# Patient Record
Sex: FEMALE | Employment: FULL TIME | URBAN - METROPOLITAN AREA
[De-identification: names, ages, dates, MRNs, and addresses within clinical notes are randomized per-mention and may not be internally consistent; named-entity substitution may affect disease eponyms.]

---

## 2017-02-11 ENCOUNTER — TELEPHONE (OUTPATIENT)
Dept: INTERNAL MEDICINE CLINIC | Age: 22
End: 2017-02-11

## 2017-02-11 RX ORDER — CEFUROXIME AXETIL 500 MG/1
500 TABLET ORAL 2 TIMES DAILY
Qty: 14 TAB | Refills: 0 | Status: SHIPPED | OUTPATIENT
Start: 2017-02-11 | End: 2017-06-20 | Stop reason: ALTCHOICE

## 2017-02-11 RX ORDER — OSELTAMIVIR PHOSPHATE 75 MG/1
CAPSULE ORAL
Qty: 10 CAP | Refills: 0 | Status: SHIPPED | OUTPATIENT
Start: 2017-02-11 | End: 2017-06-20 | Stop reason: ALTCHOICE

## 2017-02-11 NOTE — TELEPHONE ENCOUNTER
Patient with 36 hours of fever, myalgias sinus drainage. Will treat for flu and sinus.   she is to call if symptoms persist over 4 days

## 2017-05-31 ENCOUNTER — HOSPITAL ENCOUNTER (OUTPATIENT)
Dept: CT IMAGING | Age: 22
Discharge: HOME | End: 2017-05-31
Attending: ORTHOPAEDIC SURGERY
Payer: COMMERCIAL

## 2017-05-31 DIAGNOSIS — S42.442A: ICD-10-CM

## 2017-05-31 PROCEDURE — 73200 CT UPPER EXTREMITY W/O DYE: CPT

## 2017-06-20 ENCOUNTER — OFFICE VISIT (OUTPATIENT)
Dept: INTERNAL MEDICINE CLINIC | Age: 22
End: 2017-06-20

## 2017-06-20 VITALS
SYSTOLIC BLOOD PRESSURE: 120 MMHG | BODY MASS INDEX: 29.95 KG/M2 | HEART RATE: 62 BPM | OXYGEN SATURATION: 99 % | RESPIRATION RATE: 16 BRPM | DIASTOLIC BLOOD PRESSURE: 76 MMHG | WEIGHT: 169 LBS | HEIGHT: 63 IN | TEMPERATURE: 98.3 F

## 2017-06-20 DIAGNOSIS — J02.9 PHARYNGITIS, UNSPECIFIED ETIOLOGY: Primary | ICD-10-CM

## 2017-06-20 DIAGNOSIS — J02.9 PHARYNGITIS, UNSPECIFIED ETIOLOGY: ICD-10-CM

## 2017-06-20 RX ORDER — CEPHALEXIN 500 MG/1
CAPSULE ORAL
Qty: 30 CAP | Refills: 0 | Status: SHIPPED | OUTPATIENT
Start: 2017-06-20 | End: 2017-11-28 | Stop reason: SDUPTHER

## 2017-06-20 NOTE — PROGRESS NOTES
Patient presents for   Chief Complaint   Patient presents with    Fever    Sore Throat    Mass     neck glands     Fall risk assessment was not indicated. Depression screening was not indicated Follow up questions were not indicated. 1. Have you been to the ER, urgent care clinic since your last visit? Hospitalized since your last visit? No    2. Have you seen or consulted any other health care providers outside of the [facility] since your last visit? Include any pap smears or colon screening.  No

## 2017-06-20 NOTE — PROGRESS NOTES
The patient presents to the office today with the chief complaint of sore throat    HPI    The patient complains of 24 hours of sore throat. The patient notes several weeks of low grade fever to 100.5 degrees. The patient is status post a fracture of her left elbow. This in now in a hinged splint. She is receiving physical therapy      Review of Systems   Constitutional: Positive for fever. HENT: Positive for sore throat. Respiratory: Negative for shortness of breath. Cardiovascular: Negative for chest pain and leg swelling. No Known Allergies    Current Outpatient Prescriptions   Medication Sig Dispense Refill    cephALEXin (KEFLEX) 500 mg capsule 1 capsule three times per day 30 Cap 0    etonogestrel 68 mg impl by SubDERmal route. Past Medical History:   Diagnosis Date    Acute laryngitis, without mention of obstruction     Acute pharyngitis     Acute upper respiratory infections of unspecified site     Dizziness and giddiness     Other and unspecified noninfectious gastroenteritis and colitis     Paresthesia        History reviewed. No pertinent surgical history. Social History     Social History    Marital status: UNKNOWN     Spouse name: N/A    Number of children: N/A    Years of education: N/A     Occupational History    Not on file. Social History Main Topics    Smoking status: Never Smoker    Smokeless tobacco: Never Used    Alcohol use No    Drug use: No    Sexual activity: Yes     Partners: Male     Other Topics Concern    Not on file     Social History Narrative       Patient does not have an advanced directive on file    Visit Vitals    /76 (BP 1 Location: Left arm, BP Patient Position: Sitting)    Pulse 62    Temp 98.3 °F (36.8 °C) (Tympanic)    Resp 16    Ht 5' 3\" (1.6 m)    Wt 169 lb (76.7 kg)    SpO2 99%    BMI 29.94 kg/m2       Physical Exam   HENT:   Oral pharynx:   Moderately red in posterior oral pharynx   Neck: No thyromegaly present. Cardiovascular: Normal rate and regular rhythm. Exam reveals no gallop. No murmur heard. Pulmonary/Chest: She has no wheezes. She has no rales. Musculoskeletal:   Firm area of tissue left proximal arm   Lymphadenopathy:     She has no cervical adenopathy. BMI:  BMI is high but it was not addressed during this visit due to an acute illness      No visits with results within 3 Month(s) from this visit. Latest known visit with results is:    Abstract on 09/09/2015   Component Date Value Ref Range Status    Creatinine, External 03/27/2015 0.58   Final       .No results found for any visits on 06/20/17. Assessment / Plan      ICD-10-CM ICD-9-CM    1. Pharyngitis, unspecified etiology J02.9 462 CBC WITH AUTOMATED DIFF      METABOLIC PANEL, COMPREHENSIVE      C REACTIVE PROTEIN, QT      SED RATE (ESR)     Labs  Keflex  To call if fever persists    Follow-up Disposition:  Return in about 4 months (around 10/20/2017). I asked Sally Lai if she has any questions and I answered the questions.   Sally Lai states that she understands the treatment plan and agrees with the treatment plan

## 2017-06-20 NOTE — MR AVS SNAPSHOT
Visit Information Date & Time Provider Department Dept. Phone Encounter #  
 6/20/2017  4:00 PM Thang Vizcaino MD [facility] [place] [phone] [id number] Your Appointments 8/21/2017  4:00 PM  
Follow Up with Thang Vizcaino MD  
[facility] OF [place] [address]) Appt Note: 2 mo f/u  
 [address] [place], [address] [facility] [address]  
  
   
 [address] [facility] [address] [facility] [address] Upcoming Health Maintenance Date Due  
 HPV AGE 9Y-34Y (1 of 3 - Female 3 Dose Series) 8/4/2004 DTaP/Tdap/Td series (2 - Tdap) 8/4/2014 PAP AKA CERVICAL CYTOLOGY 8/4/2014 INFLUENZA AGE 9 TO ADULT 8/1/2017 Allergies as of 6/20/2017  Review Complete On: 6/20/2017 By: Vermell Goldberg, LPN No Known Allergies Current Immunizations  Never Reviewed Name Date Td 6/27/2014 Not reviewed this visit You Were Diagnosed With   
  
 Codes Comments Pharyngitis, unspecified etiology    -  Primary ICD-10-CM: J02.9 ICD-9-CM: 014 Vitals BP Pulse Temp Resp Height(growth percentile) 120/76 (BP 1 Location: Left arm, BP Patient Position: Sitting) 62 98.3 °F (36.8 °C) (Tympanic) 16 5' 3\" (1.6 m) Weight(growth percentile) SpO2 BMI OB Status Smoking Status 169 lb (76.7 kg) 99% 29.94 kg/m2 Implant Never Smoker Vitals History BMI and BSA Data Body Mass Index Body Surface Area  
 29.94 kg/m 2 1.85 m 2 Preferred Pharmacy Pharmacy Name Phone [facility] [address] [place] [address] [phone] Your Updated Medication List  
  
   
This list is accurate as of: 6/20/17  5:10 PM.  Always use your most recent med list.  
  
  
  
  
 cephALEXin 500 mg capsule Commonly known as:  KEFLEX  
1 capsule three times per day  
  
 citalopram 20 mg tablet Commonly known as:  CELEXA  
1 tablet daily  
  
 etonogestrel 68 mg Impl by SubDERmal route. fluticasone 50 mcg/actuation nasal spray Commonly known as:  FLONASE  
1 spray up each nostril twice per day for one week then may use daily Prescriptions Sent to Pharmacy Refills  
 cephALEXin (KEFLEX) 500 mg capsule 0 Si capsule three times per day Class: Normal  
 Pharmacy: [address], [address] [place] [address] #: [phone] To-Do List   
 2017 Lab:  C REACTIVE PROTEIN, QT   
  
 2017 Lab:  METABOLIC PANEL, COMPREHENSIVE   
  
 2017 Lab:  SED RATE (ESR)   
  
 10/20/2017 Lab:  CBC WITH AUTOMATED DIFF Introducing [facility]! Cris Herr introduces [employer] patient portal. Now you can access parts of your medical record, email your doctor's office, and request medication refills online. 1. In your internet browser, go to [email or web address] 2. Click on the First Time User? Click Here link in the Sign In box. You will see the New Member Sign Up page. 3. Enter your [employer] Access Code exactly as it appears below. You will not need to use this code after youve completed the sign-up process. If you do not sign up before the expiration date, you must request a new code. · [employer] Access Code: [email or web address] Expires: 2017  6:10 PM 
 
4. Enter the last four digits of your Social Security Number (xxxx) and Date of Birth (mm/dd/yyyy) as indicated and click Submit. You will be taken to the next sign-up page. 5. Create a [employer] ID. This will be your [employer] login ID and cannot be changed, so think of one that is secure and easy to remember. 6. Create a [employer] password. You can change your password at any time. 7. Enter your Password Reset Question and Answer. This can be used at a later time if you forget your password. 8. Enter your e-mail address. You will receive e-mail notification when new information is available in [address] 9. Click Sign Up. You can now view and download portions of your medical record. 10. Click the Download Summary menu link to download a portable copy of your medical information. If you have questions, please visit the Frequently Asked Questions section of the [email or web address] website. Remember, [email or web address] is NOT to be used for urgent needs. For medical emergencies, dial 911. Now available from your iPhone and Android! Please provide this summary of care documentation to your next provider. Your primary care clinician is listed as Aline Taylor. If you have any questions after today's visit, please call [phone].

## 2017-06-21 DIAGNOSIS — J02.9 PHARYNGITIS, UNSPECIFIED ETIOLOGY: ICD-10-CM

## 2017-06-21 LAB
ALBUMIN SERPL-MCNC: 4.3 G/DL (ref 3.5–5.5)
ALBUMIN/GLOB SERPL: 1.7 {RATIO} (ref 1.2–2.2)
ALP SERPL-CCNC: 57 IU/L (ref 39–117)
ALT SERPL-CCNC: 18 IU/L (ref 0–32)
AST SERPL-CCNC: 14 IU/L (ref 0–40)
BASOPHILS # BLD AUTO: 0 X10E3/UL (ref 0–0.2)
BASOPHILS NFR BLD AUTO: 0 %
BILIRUB SERPL-MCNC: <0.2 MG/DL (ref 0–1.2)
BUN SERPL-MCNC: 9 MG/DL (ref 6–20)
BUN/CREAT SERPL: 12 (ref 9–23)
CALCIUM SERPL-MCNC: 9.3 MG/DL (ref 8.7–10.2)
CHLORIDE SERPL-SCNC: 101 MMOL/L (ref 96–106)
CO2 SERPL-SCNC: 24 MMOL/L (ref 18–29)
CREAT SERPL-MCNC: 0.73 MG/DL (ref 0.57–1)
CRP SERPL-MCNC: 9.8 MG/L (ref 0–4.9)
EOSINOPHIL # BLD AUTO: 0.1 X10E3/UL (ref 0–0.4)
EOSINOPHIL NFR BLD AUTO: 1 %
ERYTHROCYTE [DISTWIDTH] IN BLOOD BY AUTOMATED COUNT: 13.2 % (ref 12.3–15.4)
ERYTHROCYTE [SEDIMENTATION RATE] IN BLOOD BY WESTERGREN METHOD: 6 MM/HR (ref 0–32)
GLOBULIN SER CALC-MCNC: 2.6 G/DL (ref 1.5–4.5)
GLUCOSE SERPL-MCNC: 95 MG/DL (ref 65–99)
HCT VFR BLD AUTO: 38.8 % (ref 34–46.6)
HGB BLD-MCNC: 12.5 G/DL (ref 11.1–15.9)
IMM GRANULOCYTES # BLD: 0 X10E3/UL (ref 0–0.1)
IMM GRANULOCYTES NFR BLD: 0 %
LYMPHOCYTES # BLD AUTO: 2 X10E3/UL (ref 0.7–3.1)
LYMPHOCYTES NFR BLD AUTO: 20 %
MCH RBC QN AUTO: 30 PG (ref 26.6–33)
MCHC RBC AUTO-ENTMCNC: 32.2 G/DL (ref 31.5–35.7)
MCV RBC AUTO: 93 FL (ref 79–97)
MONOCYTES # BLD AUTO: 0.8 X10E3/UL (ref 0.1–0.9)
MONOCYTES NFR BLD AUTO: 8 %
NEUTROPHILS # BLD AUTO: 7.2 X10E3/UL (ref 1.4–7)
NEUTROPHILS NFR BLD AUTO: 71 %
PLATELET # BLD AUTO: 398 X10E3/UL (ref 150–379)
POTASSIUM SERPL-SCNC: 4.4 MMOL/L (ref 3.5–5.2)
PROT SERPL-MCNC: 6.9 G/DL (ref 6–8.5)
RBC # BLD AUTO: 4.16 X10E6/UL (ref 3.77–5.28)
SODIUM SERPL-SCNC: 140 MMOL/L (ref 134–144)
WBC # BLD AUTO: 10.1 X10E3/UL (ref 3.4–10.8)

## 2017-10-10 ENCOUNTER — TELEPHONE (OUTPATIENT)
Dept: INTERNAL MEDICINE CLINIC | Age: 22
End: 2017-10-10

## 2017-10-10 RX ORDER — CIPROFLOXACIN HYDROCHLORIDE 3.5 MG/ML
SOLUTION/ DROPS TOPICAL
Qty: 5 ML | Refills: 1 | Status: SHIPPED | OUTPATIENT
Start: 2017-10-10 | End: 2018-01-31

## 2017-11-28 ENCOUNTER — OFFICE VISIT (OUTPATIENT)
Dept: INTERNAL MEDICINE CLINIC | Age: 22
End: 2017-11-28

## 2017-11-28 DIAGNOSIS — J06.9 ACUTE URI: ICD-10-CM

## 2017-11-28 DIAGNOSIS — H57.13 PAIN OF BOTH EYES: Primary | ICD-10-CM

## 2017-11-28 RX ORDER — SULFACETAMIDE SODIUM 100 MG/ML
SOLUTION/ DROPS OPHTHALMIC
Qty: 1 BOTTLE | Refills: 1 | Status: SHIPPED | OUTPATIENT
Start: 2017-11-28 | End: 2018-01-31

## 2017-11-28 RX ORDER — CEPHALEXIN 500 MG/1
CAPSULE ORAL
Qty: 30 CAP | Refills: 0 | Status: SHIPPED | OUTPATIENT
Start: 2017-11-28 | End: 2018-01-31

## 2017-11-29 VITALS
DIASTOLIC BLOOD PRESSURE: 74 MMHG | SYSTOLIC BLOOD PRESSURE: 122 MMHG | TEMPERATURE: 98.7 F | BODY MASS INDEX: 30.03 KG/M2 | WEIGHT: 169.5 LBS | HEART RATE: 84 BPM | HEIGHT: 63 IN

## 2017-11-29 NOTE — PROGRESS NOTES
The patient presents to the office today with the chief complaint of pain in her eyes    HPI    The patient complains of moderately severe pain in both eyes. Present for the past week. Some photophobia but the pain is there regardless of light. The patient also has sinus congestion and mild sore throat      Review of Systems   Eyes: Positive for photophobia. Eye pain   Respiratory: Negative for shortness of breath. Cardiovascular: Negative for chest pain and leg swelling. No Known Allergies    Current Outpatient Prescriptions   Medication Sig Dispense Refill    cephALEXin (KEFLEX) 500 mg capsule 1 capsule three times per day 30 Cap 0    sulfacetamide (BLEPH-10) 10 % ophthalmic solution 2 drops in each eye three times per day 1 Bottle 1    ciprofloxacin HCl (CILOXAN) 0.3 % ophthalmic solution 2 drops in infected eye three times per day 5 mL 1    etonogestrel 68 mg impl by SubDERmal route. Past Medical History:   Diagnosis Date    Acute laryngitis, without mention of obstruction     Acute pharyngitis     Acute upper respiratory infections of unspecified site     Dizziness and giddiness     Other and unspecified noninfectious gastroenteritis and colitis(558.9)     Paresthesia        No past surgical history on file. Social History     Social History    Marital status: UNKNOWN     Spouse name: N/A    Number of children: N/A    Years of education: N/A     Occupational History    Not on file.      Social History Main Topics    Smoking status: Never Smoker    Smokeless tobacco: Never Used    Alcohol use No    Drug use: No    Sexual activity: Yes     Partners: Male     Other Topics Concern    Not on file     Social History Narrative       Patient does not have an advanced directive on file    Visit Vitals    /74 (BP 1 Location: Left arm, BP Patient Position: Sitting)    Pulse 84    Temp 98.7 °F (37.1 °C) (Tympanic)    Ht 5' 3\" (1.6 m)    Wt 169 lb 8 oz (76.9 kg)  BMI 30.03 kg/m2       Physical Exam   Eyes:  Normal exam  No Cervical Lymphadenopathy  No Supraclavicular Lymphadenopathy  Thyroid is Normal  Lungs are normal to percussion. Clear to auscultation   Heart:  S1 S2 are normal, No gallops, No mummers  No Carotid Bruits  Abdomen:  Normal Bowel Sounds. No tenderness. No masses. No Hepatomegaly or Splenomegly  LE:  Strong Pedal Pulses. No Edema      BMI:  BMI is high but it was not addressed during this visit due to an acute illness      No visits with results within 3 Month(s) from this visit. Latest known visit with results is:    Office Visit on 06/20/2017   Component Date Value Ref Range Status    WBC 06/20/2017 10.1  3.4 - 10.8 x10E3/uL Final    RBC 06/20/2017 4.16  3.77 - 5.28 x10E6/uL Final    HGB 06/20/2017 12.5  11.1 - 15.9 g/dL Final    HCT 06/20/2017 38.8  34.0 - 46.6 % Final    MCV 06/20/2017 93  79 - 97 fL Final    MCH 06/20/2017 30.0  26.6 - 33.0 pg Final    MCHC 06/20/2017 32.2  31.5 - 35.7 g/dL Final    RDW 06/20/2017 13.2  12.3 - 15.4 % Final    PLATELET 82/04/3298 239* 150 - 379 x10E3/uL Final    NEUTROPHILS 06/20/2017 71  % Final    Lymphocytes 06/20/2017 20  % Final    MONOCYTES 06/20/2017 8  % Final    EOSINOPHILS 06/20/2017 1  % Final    BASOPHILS 06/20/2017 0  % Final    ABS. NEUTROPHILS 06/20/2017 7.2* 1.4 - 7.0 x10E3/uL Final    Abs Lymphocytes 06/20/2017 2.0  0.7 - 3.1 x10E3/uL Final    ABS. MONOCYTES 06/20/2017 0.8  0.1 - 0.9 x10E3/uL Final    ABS. EOSINOPHILS 06/20/2017 0.1  0.0 - 0.4 x10E3/uL Final    ABS. BASOPHILS 06/20/2017 0.0  0.0 - 0.2 x10E3/uL Final    IMMATURE GRANULOCYTES 06/20/2017 0  % Final    ABS. IMM.  GRANS. 06/20/2017 0.0  0.0 - 0.1 x10E3/uL Final    Glucose 06/20/2017 95  65 - 99 mg/dL Final    BUN 06/20/2017 9  6 - 20 mg/dL Final    Creatinine 06/20/2017 0.73  0.57 - 1.00 mg/dL Final    GFR est non-AA 06/20/2017 116  >59 mL/min/1.73 Final    GFR est AA 06/20/2017 134  >59 mL/min/1.73 Final    BUN/Creatinine ratio 06/20/2017 12  9 - 23 Final    Sodium 06/20/2017 140  134 - 144 mmol/L Final    Potassium 06/20/2017 4.4  3.5 - 5.2 mmol/L Final    Chloride 06/20/2017 101  96 - 106 mmol/L Final    CO2 06/20/2017 24  18 - 29 mmol/L Final    Calcium 06/20/2017 9.3  8.7 - 10.2 mg/dL Final    Protein, total 06/20/2017 6.9  6.0 - 8.5 g/dL Final    Albumin 06/20/2017 4.3  3.5 - 5.5 g/dL Final    GLOBULIN, TOTAL 06/20/2017 2.6  1.5 - 4.5 g/dL Final    A-G Ratio 06/20/2017 1.7  1.2 - 2.2 Final    Bilirubin, total 06/20/2017 <0.2  0.0 - 1.2 mg/dL Final    Alk. phosphatase 06/20/2017 57  39 - 117 IU/L Final    AST (SGOT) 06/20/2017 14  0 - 40 IU/L Final    ALT (SGPT) 06/20/2017 18  0 - 32 IU/L Final    Sed rate (ESR) 06/20/2017 6  0 - 32 mm/hr Final    C-Reactive Protein, Qt 06/20/2017 9.8* 0.0 - 4.9 mg/L Final       .No results found for any visits on 11/28/17. Assessment / Plan      ICD-10-CM ICD-9-CM    1. Pain of both eyes H57.13 379.91 cephALEXin (KEFLEX) 500 mg capsule      sulfacetamide (BLEPH-10) 10 % ophthalmic solution      CBC WITH AUTOMATED DIFF      METABOLIC PANEL, COMPREHENSIVE      C REACTIVE PROTEIN, QT      SED RATE (ESR)      REFERRAL TO OPHTHALMOLOGY   2. Acute URI J06.9 465.9 cephALEXin (KEFLEX) 500 mg capsule      sulfacetamide (BLEPH-10) 10 % ophthalmic solution      CBC WITH AUTOMATED DIFF      METABOLIC PANEL, COMPREHENSIVE      C REACTIVE PROTEIN, QT      SED RATE (ESR)       Labs  [facility] Keflex  [facility] Sulfa ophthalmology sol  To ophthalmology      Follow-up Disposition:  Return in about 5 months (around 4/28/2018). I asked Noe Simmons if she has any questions and I answered the questions.   Noe Simmons states that she understands the treatment plan and agrees with the treatment plan

## 2018-01-31 ENCOUNTER — OFFICE VISIT (OUTPATIENT)
Dept: INTERNAL MEDICINE CLINIC | Age: 23
End: 2018-01-31

## 2018-01-31 VITALS
HEART RATE: 110 BPM | WEIGHT: 168 LBS | RESPIRATION RATE: 16 BRPM | DIASTOLIC BLOOD PRESSURE: 80 MMHG | TEMPERATURE: 98.8 F | HEIGHT: 63 IN | BODY MASS INDEX: 29.77 KG/M2 | OXYGEN SATURATION: 97 % | SYSTOLIC BLOOD PRESSURE: 110 MMHG

## 2018-01-31 DIAGNOSIS — J11.1 INFLUENZA: Primary | ICD-10-CM

## 2018-01-31 RX ORDER — OSELTAMIVIR PHOSPHATE 75 MG/1
CAPSULE ORAL
Qty: 10 CAP | Refills: 0 | Status: SHIPPED | OUTPATIENT
Start: 2018-01-31 | End: 2018-12-24 | Stop reason: ALTCHOICE

## 2018-01-31 NOTE — LETTER
NOTIFICATION RETURN TO WORK / SCHOOL 
 
1/31/2018 1:45 PM 
 
Ms. Ayaan Blackburn [address] [address] [address] [address] To Whom It May Concern: 
 
Ayaan Blackburn is currently under the care of 53 [address]ner. She will return to work on: 2/5/18 If there are questions or concerns please have the patient contact our office.  
 
 
 
Sincerely, 
 
 
Dewey Knapp MD

## 2018-01-31 NOTE — PATIENT INSTRUCTIONS

## 2018-01-31 NOTE — PROGRESS NOTES
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 Past Medical History:   Diagnosis Date    Acute laryngitis, without mention of obstruction     Acute pharyngitis     Acute upper respiratory infections of unspecified site     Dizziness and giddiness     Other and unspecified noninfectious gastroenteritis and colitis(558.9)     Paresthesia        History reviewed. No pertinent surgical history. Social History     Social History    Marital status: UNKNOWN     Spouse name: N/A    Number of children: N/A    Years of education: N/A     Occupational History    Not on file. Social History Main Topics    Smoking status: Never Smoker    Smokeless tobacco: Never Used    Alcohol use No    Drug use: No    Sexual activity: Yes     Partners: Male     Other Topics Concern    Not on file     Social History Narrative       No Known Allergies    Family History   Problem Relation Age of Onset    Cancer Mother     Heart Disease Father        Current Outpatient Prescriptions   Medication Sig Dispense Refill    etonogestrel 68 mg impl by SubDERmal route. Visit Vitals    /80 (BP 1 Location: Left arm, BP Patient Position: Sitting)    Pulse (!) 110    Temp 98.8 °F (37.1 °C) (Tympanic)    Resp 16    Ht 5' 3\" (1.6 m)    Wt 168 lb (76.2 kg)    SpO2 97%    BMI 29.76 kg/m2       PE  Well nourished in NAD  HEENT:   OP: clear. TMS: clear  Neck: supple w/o mass   Chest: clear. CV: RRR w/o m,r,g  Abd: soft, NT, w/o HSM or mass. Ext: w/o edema. Neuro: NF. Assessment and Plan    Encounter Diagnoses   Name Primary?     Influenza Yes   Empiric rx of influenza with tamiflu 75 mg bid for 5 days (low sensitivity of nasal swab for flu detection)  Tylenol prn  F/U worsening or unimproved 5 days  OV 6 mos or prn  I have explained plan to patient and the patient verbalizes understanding

## 2018-01-31 NOTE — PROGRESS NOTES
1. Have you been to the ER, urgent care clinic since your last visit? Hospitalized since your last visit? No    2. Have you seen or consulted any other health care providers outside of the [place] [address] since your last visit? Include any pap smears or colon screening.  No

## 2018-02-16 ENCOUNTER — TELEPHONE (OUTPATIENT)
Dept: INTERNAL MEDICINE CLINIC | Age: 23
End: 2018-02-16

## 2018-02-16 NOTE — TELEPHONE ENCOUNTER
Patient request referral to [facility], Dr. Fransisco Willard. She has appointment on 3/2 at 12:00. Their office requires referral from our office.

## 2018-03-12 ENCOUNTER — TELEPHONE (OUTPATIENT)
Dept: INTERNAL MEDICINE CLINIC | Age: 23
End: 2018-03-12

## 2018-03-12 DIAGNOSIS — J06.9 ACUTE UPPER RESPIRATORY INFECTION: Primary | ICD-10-CM

## 2018-03-22 ENCOUNTER — HOSPITAL ENCOUNTER (OUTPATIENT)
Dept: LAB | Age: 23
Discharge: HOME | End: 2018-03-22

## 2018-03-22 PROCEDURE — 99001 SPECIMEN HANDLING PT-LAB: CPT | Performed by: ALLERGY & IMMUNOLOGY

## 2018-08-02 ENCOUNTER — OFFICE VISIT (OUTPATIENT)
Dept: INTERNAL MEDICINE CLINIC | Age: 23
End: 2018-08-02

## 2018-08-02 VITALS
SYSTOLIC BLOOD PRESSURE: 126 MMHG | RESPIRATION RATE: 16 BRPM | HEART RATE: 94 BPM | TEMPERATURE: 98.6 F | HEIGHT: 63 IN | OXYGEN SATURATION: 99 % | DIASTOLIC BLOOD PRESSURE: 74 MMHG

## 2018-08-02 DIAGNOSIS — N30.00 ACUTE CYSTITIS WITHOUT HEMATURIA: ICD-10-CM

## 2018-08-02 DIAGNOSIS — N30.00 ACUTE CYSTITIS WITHOUT HEMATURIA: Primary | ICD-10-CM

## 2018-08-02 LAB
BILIRUB UR QL STRIP: NEGATIVE
GLUCOSE UR-MCNC: NORMAL MG/DL
KETONES P FAST UR STRIP-MCNC: NEGATIVE MG/DL
PH UR STRIP: 7 [PH] (ref 4.6–8)
PROT UR QL STRIP: NORMAL
SP GR UR STRIP: 1.02 (ref 1–1.03)
UA UROBILINOGEN AMB POC: NORMAL (ref 0.2–1)
URINALYSIS CLARITY POC: CLEAR
URINALYSIS COLOR POC: YELLOW
URINE BLOOD POC: NORMAL
URINE LEUKOCYTES POC: NORMAL
URINE NITRITES POC: NEGATIVE

## 2018-08-02 RX ORDER — FLUCONAZOLE 150 MG/1
150 TABLET ORAL DAILY
Qty: 1 TAB | Refills: 0 | Status: SHIPPED | OUTPATIENT
Start: 2018-08-02 | End: 2018-08-02

## 2018-08-02 RX ORDER — PHENAZOPYRIDINE HYDROCHLORIDE 100 MG/1
100 TABLET, FILM COATED ORAL
Qty: 9 TAB | Refills: 0 | Status: SHIPPED | OUTPATIENT
Start: 2018-08-02 | End: 2018-08-05

## 2018-08-02 RX ORDER — FLUCONAZOLE 150 MG/1
150 TABLET ORAL DAILY
Qty: 1 TAB | Refills: 0 | Status: SHIPPED | OUTPATIENT
Start: 2018-08-02 | End: 2019-07-24 | Stop reason: SDUPTHER

## 2018-08-02 RX ORDER — NITROFURANTOIN 25; 75 MG/1; MG/1
100 CAPSULE ORAL 2 TIMES DAILY
Qty: 14 CAP | Refills: 0 | Status: SHIPPED | OUTPATIENT
Start: 2018-08-02 | End: 2018-12-24 | Stop reason: ALTCHOICE

## 2018-08-02 RX ORDER — NITROFURANTOIN 25; 75 MG/1; MG/1
100 CAPSULE ORAL 2 TIMES DAILY
Qty: 14 CAP | Refills: 0 | Status: SHIPPED | OUTPATIENT
Start: 2018-08-02 | End: 2018-08-02

## 2018-08-02 RX ORDER — PHENAZOPYRIDINE HYDROCHLORIDE 100 MG/1
100 TABLET, FILM COATED ORAL
Qty: 9 TAB | Refills: 0 | Status: SHIPPED | OUTPATIENT
Start: 2018-08-02 | End: 2018-08-02

## 2018-08-02 NOTE — PROGRESS NOTES
Ciara Dong is a 20 y.o. female presenting today for Urinary Burning  . HPI:  Ciara Dong presents to the office today for dysuria, urgency and frequency x 2 days. She is negative for fever. Review of Systems   Respiratory: Negative for cough. Cardiovascular: Negative for chest pain and palpitations. Genitourinary: Positive for dysuria, frequency and urgency. No Known Allergies    Current Outpatient Prescriptions   Medication Sig Dispense Refill    nitrofurantoin, macrocrystal-monohydrate, (MACROBID) 100 mg capsule Take 1 Cap by mouth two (2) times a day. 14 Cap 0    oseltamivir (TAMIFLU) 75 mg capsule 1 bid for 5 days 10 Cap 0    etonogestrel 68 mg impl by SubDERmal route. Past Medical History:   Diagnosis Date    Acute laryngitis, without mention of obstruction     Acute pharyngitis     Acute upper respiratory infections of unspecified site     Dizziness and giddiness     Other and unspecified noninfectious gastroenteritis and colitis(558.9)     Paresthesia        History reviewed. No pertinent surgical history. Social History     Social History    Marital status: UNKNOWN     Spouse name: N/A    Number of children: N/A    Years of education: N/A     Occupational History    Not on file. Social History Main Topics    Smoking status: Never Smoker    Smokeless tobacco: Never Used    Alcohol use No    Drug use: No    Sexual activity: Yes     Partners: Male     Other Topics Concern    Not on file     Social History Narrative       Patient does not have an advanced directive on file    Vitals:    08/02/18 1639   BP: 126/74   Pulse: 94   Resp: 16   Temp: 98.6 °F (37 °C)   TempSrc: Tympanic   SpO2: 99%   Height: 5' 3\" (1.6 m)   PainSc:   5   PainLoc: Pelvic       Physical Exam   Constitutional: No distress. Cardiovascular: Normal rate, regular rhythm and normal heart sounds. Pulmonary/Chest: Effort normal and breath sounds normal.   Abdominal: Soft.    Nursing note and vitals reviewed. Office Visit on 08/02/2018   Component Date Value Ref Range Status    Color (UA POC) 08/02/2018 Yellow   Final    Clarity (UA POC) 08/02/2018 Clear   Final    Glucose (UA POC) 08/02/2018 Trace  Negative Final    Bilirubin (UA POC) 08/02/2018 Negative  Negative Final    Ketones (UA POC) 08/02/2018 Negative  Negative Final    Specific gravity (UA POC) 08/02/2018 1.025  1.001 - 1.035 Final    Blood (UA POC) 08/02/2018 Trace  Negative Final    pH (UA POC) 08/02/2018 7.0  4.6 - 8.0 Final    Protein (UA POC) 08/02/2018 1+  Negative Final    Urobilinogen (UA POC) 08/02/2018 0.2 mg/dL  0.2 - 1 Final    Nitrites (UA POC) 08/02/2018 Negative  Negative Final    Leukocyte esterase (UA POC) 08/02/2018 1+  Negative Final       .  Results for orders placed or performed in visit on 08/02/18   AMB POC URINALYSIS DIP STICK AUTO W/O MICRO   Result Value Ref Range    Color (UA POC) Yellow     Clarity (UA POC) Clear     Glucose (UA POC) Trace Negative    Bilirubin (UA POC) Negative Negative    Ketones (UA POC) Negative Negative    Specific gravity (UA POC) 1.025 1.001 - 1.035    Blood (UA POC) Trace Negative    pH (UA POC) 7.0 4.6 - 8.0    Protein (UA POC) 1+ Negative    Urobilinogen (UA POC) 0.2 mg/dL 0.2 - 1    Nitrites (UA POC) Negative Negative    Leukocyte esterase (UA POC) 1+ Negative       Assessment / Plan:      ICD-10-CM ICD-9-CM    1.  Acute cystitis without hematuria N30.00 595.0 phenazopyridine (PYRIDIUM) 100 mg tablet      nitrofurantoin, macrocrystal-monohydrate, (MACROBID) 100 mg capsule      fluconazole (DIFLUCAN) 150 mg tablet      AMB POC URINALYSIS DIP STICK AUTO W/O MICRO      CULTURE, URINE      DISCONTINUED: phenazopyridine (PYRIDIUM) 100 mg tablet      DISCONTINUED: nitrofurantoin, macrocrystal-monohydrate, (MACROBID) 100 mg capsule      DISCONTINUED: fluconazole (DIFLUCAN) 150 mg tablet       Acute cystitis  Macrobid   Pyridium  Fluconazole rx  F/u prn    Follow-up Disposition:  Return if symptoms worsen or fail to improve. I asked the patient if she  had any questions and answered her  questions.   The patient stated that she understands the treatment plan and agrees with the treatment plan

## 2018-12-24 ENCOUNTER — OFFICE VISIT (OUTPATIENT)
Dept: INTERNAL MEDICINE CLINIC | Age: 23
End: 2018-12-24

## 2018-12-24 ENCOUNTER — HOSPITAL ENCOUNTER (OUTPATIENT)
Dept: LAB | Age: 23
Discharge: HOME | End: 2018-12-24

## 2018-12-24 VITALS
WEIGHT: 182 LBS | TEMPERATURE: 98.8 F | DIASTOLIC BLOOD PRESSURE: 76 MMHG | SYSTOLIC BLOOD PRESSURE: 120 MMHG | HEIGHT: 63 IN | OXYGEN SATURATION: 98 % | HEART RATE: 84 BPM | BODY MASS INDEX: 32.25 KG/M2

## 2018-12-24 DIAGNOSIS — R30.0 DYSURIA: ICD-10-CM

## 2018-12-24 DIAGNOSIS — Z00.00 WELL ADULT EXAM: Primary | ICD-10-CM

## 2018-12-24 PROCEDURE — 99001 SPECIMEN HANDLING PT-LAB: CPT

## 2018-12-24 RX ORDER — CIPROFLOXACIN 250 MG/1
250 TABLET, FILM COATED ORAL EVERY 12 HOURS
Qty: 20 TAB | Refills: 0 | Status: SHIPPED | OUTPATIENT
Start: 2018-12-24 | End: 2019-01-03

## 2018-12-24 RX ORDER — CIPROFLOXACIN 250 MG/1
250 TABLET, FILM COATED ORAL EVERY 12 HOURS
Qty: 10 TAB | Refills: 0 | Status: SHIPPED | OUTPATIENT
Start: 2018-12-24 | End: 2019-01-03

## 2018-12-24 RX ORDER — NORGESTIMATE AND ETHINYL ESTRADIOL 0.25-0.035
KIT ORAL
COMMUNITY
Start: 2018-12-22 | End: 2019-11-20 | Stop reason: ALTCHOICE

## 2018-12-24 NOTE — PROGRESS NOTES
The patient presents to the office today for a check up    HPI    The patient presents to the office today for a check up. The patient remains on birth control medications . The patient had a GI infection one week ago. This was associated with frequent vomiting. This resolved in 24 hours. The patient now has mild to moderate dysuria with urinary frequency      Review of Systems   Respiratory: Negative for shortness of breath. Cardiovascular: Negative for chest pain and leg swelling. No Known Allergies    Current Outpatient Medications   Medication Sig Dispense Refill    SPRINTEC, 28, 0.25-35 mg-mcg tab       ciprofloxacin HCl (CIPRO) 250 mg tablet Take 1 Tab by mouth every twelve (12) hours for 10 days. 20 Tab 0    ciprofloxacin HCl (CIPRO) 250 mg tablet Take 1 Tab by mouth every twelve (12) hours for 10 days. 10 Tab 0       Past Medical History:   Diagnosis Date    Acute laryngitis, without mention of obstruction     Acute pharyngitis     Acute upper respiratory infections of unspecified site     Dizziness and giddiness     Other and unspecified noninfectious gastroenteritis and colitis(558.9)     Paresthesia        History reviewed. No pertinent surgical history.     Social History     Socioeconomic History    Marital status: UNKNOWN     Spouse name: Not on file    Number of children: Not on file    Years of education: Not on file    Highest education level: Not on file   Social Needs    Financial resource strain: Not on file    Food insecurity - worry: Not on file    Food insecurity - inability: Not on file   [employer] needs - medical: Not on file   [employer] needs - non-medical: Not on file   Occupational History    Not on file   Tobacco Use    Smoking status: Never Smoker    Smokeless tobacco: Never Used   Substance and Sexual Activity    Alcohol use: No    Drug use: No    Sexual activity: Yes     Partners: Male   Other Topics Concern    Not on file   Social History Narrative    Not on file       Patient does not have an advanced directive on file    Visit Vitals  /76 (BP 1 Location: Left arm, BP Patient Position: Sitting)   Pulse 84   Temp 98.8 °F (37.1 °C) (Tympanic)   Ht 5' 3\" (1.6 m)   Wt 182 lb (82.6 kg)   SpO2 98%   BMI 32.24 kg/m²       Physical Exam   No Cervical Lymphadenopathy  No Supraclavicular Lymphadenopathy  Thyroid is Normal  Lungs are normal to percussion. Clear to auscultation   Heart:  S1 S2 are normal, No gallops, No murmurs  No Carotid Bruits  Abdomen:  Normal Bowel Sounds. No tenderness. No masses. No Hepatomegaly or Splenomegaly  LE:  Strong Pedal Pulses. No Edema      BMI:  OK    No visits with results within 3 Month(s) from this visit. Latest known visit with results is:   Office Visit on 08/02/2018   Component Date Value Ref Range Status    Color (UA POC) 08/02/2018 Yellow   Final    Clarity (UA POC) 08/02/2018 Clear   Final    Glucose (UA POC) 08/02/2018 Trace  Negative Final    Bilirubin (UA POC) 08/02/2018 Negative  Negative Final    Ketones (UA POC) 08/02/2018 Negative  Negative Final    Specific gravity (UA POC) 08/02/2018 1.025  1.001 - 1.035 Final    Blood (UA POC) 08/02/2018 Trace  Negative Final    pH (UA POC) 08/02/2018 7.0  4.6 - 8.0 Final    Protein (UA POC) 08/02/2018 1+  Negative Final    Urobilinogen (UA POC) 08/02/2018 0.2 mg/dL  0.2 - 1 Final    Nitrites (UA POC) 08/02/2018 Negative  Negative Final    Leukocyte esterase (UA POC) 08/02/2018 1+  Negative Final       .No results found for any visits on 12/24/18. Assessment / Plan      ICD-10-CM ICD-9-CM    1. Well adult exam Z00.00 V70.0 CBC WITH AUTOMATED DIFF      METABOLIC PANEL, COMPREHENSIVE      METABOLIC PANEL, COMPREHENSIVE      CBC WITH AUTOMATED DIFF   2. Dysuria R30.0 788. 1 URINALYSIS W/MICROSCOPIC      CULTURE, URINE      CULTURE, URINE      URINALYSIS W/MICROSCOPIC     Blood work and urine analysis - culture ordered  I advised the patient to continue good health habits  she was advised to continue her maintenance medications    Follow-up Disposition:  Return in about 1 year (around 12/24/2019). I asked Jonn Stover if she has any questions and I answered the questions.   Jonn Stover states that she understands the treatment plan and agrees with the treatment plan

## 2018-12-24 NOTE — PROGRESS NOTES
Chief Complaint   Patient presents with    Complete Physical       Depression Screening:  PHQ over the last two weeks 9/11/2015   Little interest or pleasure in doing things Several days   Feeling down, depressed, irritable, or hopeless Several days   Total Score PHQ 2 2       Learning Assessment:  Learning Assessment 4/25/2016   PRIMARY LEARNER Patient   HIGHEST LEVEL OF EDUCATION - PRIMARY LEARNER  4 YEARS OF COLLEGE   BARRIERS PRIMARY LEARNER NONE   CO-LEARNER CAREGIVER No   PRIMARY LANGUAGE ENGLISH    NEED No   LEARNER PREFERENCE PRIMARY DEMONSTRATION   ANSWERED BY PATIENT   RELATIONSHIP SELF       Abuse Screening:  Abuse Screening Questionnaire 4/25/2016   Do you ever feel afraid of your partner? N   Are you in a relationship with someone who physically or mentally threatens you? N   Is it safe for you to go home? Y           1. Have you been to the ER, urgent care clinic since your last visit? Hospitalized since your last visit? No    2. Have you seen or consulted any other health care providers outside of the [address] [place] [address] since your last visit? Include any pap smears or colon screening.  No

## 2018-12-27 RX ORDER — OMEPRAZOLE 40 MG/1
CAPSULE, DELAYED RELEASE ORAL
Qty: 30 CAP | Refills: 3 | Status: SHIPPED | OUTPATIENT
Start: 2018-12-27 | End: 2019-06-30 | Stop reason: SDUPTHER

## 2018-12-29 LAB
APPEARANCE UR: CLEAR
BACTERIA #/AREA URNS HPF: ABNORMAL /[HPF]
BACTERIA UR CULT: NO GROWTH
BILIRUB UR QL STRIP: NEGATIVE
CASTS URNS QL MICRO: ABNORMAL /LPF
COLOR UR: YELLOW
CRYSTALS URNS MICRO: ABNORMAL
EPI CELLS #/AREA URNS HPF: ABNORMAL /HPF
GLUCOSE UR QL: ABNORMAL
HGB UR QL STRIP: NEGATIVE
KETONES UR QL STRIP: NEGATIVE
LEUKOCYTE ESTERASE UR QL STRIP: NEGATIVE
MICRO URNS: ABNORMAL
MUCOUS THREADS URNS QL MICRO: PRESENT
NITRITE UR QL STRIP: POSITIVE
PH UR STRIP: 6 [PH] (ref 5–7.5)
PROT UR QL STRIP: NEGATIVE
RBC #/AREA URNS HPF: ABNORMAL /HPF
SP GR UR: 1.02 (ref 1–1.03)
SPECIMEN STATUS REPORT, ROLRST: NORMAL
UNIDENT CRYS URNS QL MICRO: PRESENT
UROBILINOGEN UR STRIP-MCNC: 0.2 MG/DL (ref 0.2–1)
WBC #/AREA URNS HPF: ABNORMAL /HPF

## 2018-12-31 LAB
ALBUMIN SERPL-MCNC: 4.3 G/DL (ref 3.5–5.5)
ALBUMIN/GLOB SERPL: 1.8 {RATIO} (ref 1.2–2.2)
ALP SERPL-CCNC: 35 IU/L (ref 39–117)
ALT SERPL-CCNC: 14 IU/L (ref 0–32)
AST SERPL-CCNC: 14 IU/L (ref 0–40)
BASOPHILS # BLD AUTO: 0 X10E3/UL (ref 0–0.2)
BASOPHILS NFR BLD AUTO: 1 %
BILIRUB SERPL-MCNC: <0.2 MG/DL (ref 0–1.2)
BUN SERPL-MCNC: 12 MG/DL (ref 6–20)
BUN/CREAT SERPL: 16 (ref 9–23)
CALCIUM SERPL-MCNC: 9.4 MG/DL (ref 8.7–10.2)
CHLORIDE SERPL-SCNC: 102 MMOL/L (ref 96–106)
CO2 SERPL-SCNC: 21 MMOL/L (ref 20–29)
CREAT SERPL-MCNC: 0.77 MG/DL (ref 0.57–1)
EOSINOPHIL # BLD AUTO: 0.1 X10E3/UL (ref 0–0.4)
EOSINOPHIL NFR BLD AUTO: 2 %
ERYTHROCYTE [DISTWIDTH] IN BLOOD BY AUTOMATED COUNT: 13.3 % (ref 12.3–15.4)
GLOBULIN SER CALC-MCNC: 2.4 G/DL (ref 1.5–4.5)
GLUCOSE SERPL-MCNC: 79 MG/DL (ref 65–99)
HCT VFR BLD AUTO: 37.5 % (ref 34–46.6)
HGB BLD-MCNC: 12.7 G/DL (ref 11.1–15.9)
IMM GRANULOCYTES # BLD: 0 X10E3/UL (ref 0–0.1)
IMM GRANULOCYTES NFR BLD: 0 %
LYMPHOCYTES # BLD AUTO: 1.4 X10E3/UL (ref 0.7–3.1)
LYMPHOCYTES NFR BLD AUTO: 24 %
MCH RBC QN AUTO: 30.3 PG (ref 26.6–33)
MCHC RBC AUTO-ENTMCNC: 33.9 G/DL (ref 31.5–35.7)
MCV RBC AUTO: 90 FL (ref 79–97)
MONOCYTES # BLD AUTO: 0.6 X10E3/UL (ref 0.1–0.9)
MONOCYTES NFR BLD AUTO: 11 %
NEUTROPHILS # BLD AUTO: 3.6 X10E3/UL (ref 1.4–7)
NEUTROPHILS NFR BLD AUTO: 62 %
PLATELET # BLD AUTO: 376 X10E3/UL (ref 150–379)
POTASSIUM SERPL-SCNC: 4.4 MMOL/L (ref 3.5–5.2)
PROT SERPL-MCNC: 6.7 G/DL (ref 6–8.5)
RBC # BLD AUTO: 4.19 X10E6/UL (ref 3.77–5.28)
SODIUM SERPL-SCNC: 135 MMOL/L (ref 134–144)
WBC # BLD AUTO: 5.7 X10E3/UL (ref 3.4–10.8)

## 2019-07-01 RX ORDER — OMEPRAZOLE 40 MG/1
CAPSULE, DELAYED RELEASE ORAL
Qty: 30 CAP | Refills: 3 | Status: SHIPPED | OUTPATIENT
Start: 2019-07-01 | End: 2019-12-09 | Stop reason: ALTCHOICE

## 2019-07-24 DIAGNOSIS — N30.00 ACUTE CYSTITIS WITHOUT HEMATURIA: ICD-10-CM

## 2019-07-25 RX ORDER — FLUCONAZOLE 150 MG/1
150 TABLET ORAL DAILY
Qty: 1 TAB | Refills: 0 | Status: SHIPPED | OUTPATIENT
Start: 2019-07-25 | End: 2019-07-26

## 2019-11-01 ENCOUNTER — OFFICE VISIT (OUTPATIENT)
Dept: FAMILY MEDICINE CLINIC | Facility: CLINIC | Age: 24
End: 2019-11-01

## 2019-11-01 VITALS
RESPIRATION RATE: 12 BRPM | HEART RATE: 73 BPM | SYSTOLIC BLOOD PRESSURE: 110 MMHG | WEIGHT: 185 LBS | OXYGEN SATURATION: 97 % | HEIGHT: 63 IN | DIASTOLIC BLOOD PRESSURE: 70 MMHG | BODY MASS INDEX: 32.78 KG/M2 | TEMPERATURE: 97.3 F

## 2019-11-01 DIAGNOSIS — Z00.00 ANNUAL PHYSICAL EXAM: Primary | ICD-10-CM

## 2019-11-01 NOTE — PROGRESS NOTES
The patient presents to the office today with the chief complaint of *** HPI 
 
*** 
 
 
ROS 
*** No Known Allergies Current Outpatient Medications Medication Sig Dispense Refill  omeprazole (PRILOSEC) 40 mg capsule TAKE 1 CAPSULE BY MOUTH IN THE MORNING 30 Cap 3  
 SPRINTEC, 28, 0.25-35 mg-mcg tab Past Medical History:  
Diagnosis Date  Acute laryngitis, without mention of obstruction  Acute pharyngitis  Acute upper respiratory infections of unspecified site  Dizziness and giddiness  Other and unspecified noninfectious gastroenteritis and colitis(558.9)  Paresthesia History reviewed. No pertinent surgical history. Social History Socioeconomic History  Marital status: UNKNOWN Spouse name: Not on file  Number of children: Not on file  Years of education: Not on file  Highest education level: Not on file Occupational History  Not on file Social Needs  Financial resource strain: Not on file  Food insecurity:  
  Worry: Not on file Inability: Not on file  Transportation needs:  
  Medical: Not on file Non-medical: Not on file Tobacco Use  Smoking status: Never Smoker  Smokeless tobacco: Never Used Substance and Sexual Activity  Alcohol use: No  
 Drug use: No  
 Sexual activity: Yes  
  Partners: Male Lifestyle  Physical activity:  
  Days per week: Not on file Minutes per session: Not on file  Stress: Not on file Relationships  Social connections:  
  Talks on phone: Not on file Gets together: Not on file Attends [employer] service: Not on file Active member of club or organization: Not on file Attends meetings of clubs or organizations: Not on file Relationship status: Not on file  Intimate partner violence:  
  Fear of current or ex partner: Not on file Emotionally abused: Not on file Physically abused: Not on file Forced sexual activity: Not on file Other Topics Concern  Not on file Social History Narrative  Not on file Patient {DOES/DOES NOT/WILD CARD (D):57771} have an advanced directive on file Visit Vitals /70 (BP 1 Location: Left arm, BP Patient Position: Sitting) Pulse 73 Temp 97.3 °F (36.3 °C) Resp 12 Ht 5' 3\" (1.6 m) Wt 185 lb (83.9 kg) SpO2 97% BMI 32.77 kg/m² Physical Exam 
*** 
 
BMI:  *** No visits with results within 3 Month(s) from this visit. Latest known visit with results is:  
Office Visit on 12/24/2018 Component Date Value Ref Range Status  Glucose 12/24/2018 79  65 - 99 mg/dL Final  
 BUN 12/24/2018 12  6 - 20 mg/dL Final  
 Creatinine 12/24/2018 0.77  0.57 - 1.00 mg/dL Final  
 GFR est non-AA 12/24/2018 108  >59 mL/min/1.73 Final  
 GFR est AA 12/24/2018 124  >59 mL/min/1.73 Final  
 BUN/Creatinine ratio 12/24/2018 16  9 - 23 Final  
 Sodium 12/24/2018 135  134 - 144 mmol/L Final  
 Potassium 12/24/2018 4.4  3.5 - 5.2 mmol/L Final  
 Chloride 12/24/2018 102  96 - 106 mmol/L Final  
 CO2 12/24/2018 21  20 - 29 mmol/L Final  
 Calcium 12/24/2018 9.4  8.7 - 10.2 mg/dL Final  
 Protein, total 12/24/2018 6.7  6.0 - 8.5 g/dL Final  
 Albumin 12/24/2018 4.3  3.5 - 5.5 g/dL Final  
 GLOBULIN, TOTAL 12/24/2018 2.4  1.5 - 4.5 g/dL Final  
 A-G Ratio 12/24/2018 1.8  1.2 - 2.2 Final  
 Bilirubin, total 12/24/2018 <0.2  0.0 - 1.2 mg/dL Final  
 Alk.  phosphatase 12/24/2018 35* 39 - 117 IU/L Final  
 AST (SGOT) 12/24/2018 14  0 - 40 IU/L Final  
 ALT (SGPT) 12/24/2018 14  0 - 32 IU/L Final  
 WBC 12/24/2018 5.7  3.4 - 10.8 x10E3/uL Final  
 RBC 12/24/2018 4.19  3.77 - 5.28 x10E6/uL Final  
 HGB 12/24/2018 12.7  11.1 - 15.9 g/dL Final  
 HCT 12/24/2018 37.5  34.0 - 46.6 % Final  
 MCV 12/24/2018 90  79 - 97 fL Final  
 MCH 12/24/2018 30.3  26.6 - 33.0 pg Final  
 MCHC 12/24/2018 33.9  31.5 - 35.7 g/dL Final  
  RDW 12/24/2018 13.3  12.3 - 15.4 % Final  
 PLATELET 91/91/0539 056  150 - 379 x10E3/uL Final  
 NEUTROPHILS 12/24/2018 62  Not Estab. % Final  
 Lymphocytes 12/24/2018 24  Not Estab. % Final  
 MONOCYTES 12/24/2018 11  Not Estab. % Final  
 EOSINOPHILS 12/24/2018 2  Not Estab. % Final  
 BASOPHILS 12/24/2018 1  Not Estab. % Final  
 ABS. NEUTROPHILS 12/24/2018 3.6  1.4 - 7.0 x10E3/uL Final  
 Abs Lymphocytes 12/24/2018 1.4  0.7 - 3.1 x10E3/uL Final  
 ABS. MONOCYTES 12/24/2018 0.6  0.1 - 0.9 x10E3/uL Final  
 ABS. EOSINOPHILS 12/24/2018 0.1  0.0 - 0.4 x10E3/uL Final  
 ABS. BASOPHILS 12/24/2018 0.0  0.0 - 0.2 x10E3/uL Final  
 IMMATURE GRANULOCYTES 12/24/2018 0  Not Estab. % Final  
 ABS. IMM. GRANS. 12/24/2018 0.0  0.0 - 0.1 x10E3/uL Final  
 Specific Gravity 12/24/2018 1.023  1.005 - 1.030 Final  
 pH (UA) 12/24/2018 6.0  5.0 - 7.5 Final  
 Color 12/24/2018 Yellow  Yellow Final  
 Appearance 12/24/2018 Clear  Clear Final  
 Leukocyte Esterase 12/24/2018 Negative  Negative Final  
 Protein 12/24/2018 Negative  Negative/Trace Final  
 Glucose 12/24/2018 Trace* Negative Final  
 Ketone 12/24/2018 Negative  Negative Final  
 Blood 12/24/2018 Negative  Negative Final  
 Bilirubin 12/24/2018 Negative  Negative Final  
 Urobilinogen 12/24/2018 0.2  0.2 - 1.0 mg/dL Final  
 Nitrites 12/24/2018 Positive* Negative Final  
 Microscopic Examination 12/24/2018 See additional order   Final  
 Microscopic was indicated and was performed.  WBC 12/24/2018 0-5  0 - 5 /hpf Final  
 RBC 12/24/2018 None seen  0 - 2 /hpf Final  
 Epithelial cells 12/24/2018 0-10  0 - 10 /hpf Final  
 Casts 12/24/2018 None seen  None seen /lpf Final  
 Crystals 12/24/2018 Present* N/A Final  
 Crystal type 12/24/2018 Calcium Oxalate  N/A Final  
 Mucus 12/24/2018 Present  Not Estab.  Final  
 Bacteria 12/24/2018 Few  None seen/Few Final  
 Urine Culture, Routine 12/24/2018 No growth   Final  
  SPECIMEN STATUS REPORT 12/24/2018 COMMENT   Final  
 Comment: Written Authorization Written Authorization Written Authorization Received. Authorization received from original order 12- Logged by [employer] No results found for any visits on 11/01/19. Assessment / Plan 
 
{Assessment and Plan Chronic Disease:16989} *** Follow-up and Dispositions · Return in about 6 months (around 5/1/2020). *** 
 
 
 
THIS DOCUMENT WAS CREATED WITH A VOICE ACTIVATED DICTATION SYSTEM.   IT MAY CONTAIN TRANSCRIPTION ERRORS

## 2019-11-01 NOTE — PROGRESS NOTES
Keyon Crawford presents today for   Chief Complaint   Patient presents with    Well Woman       Keyon Crawford preferred language for health care discussion is [address] Is someone accompanying this pt? no    Is the patient using any DME equipment during [address]? no    Depression Screening:  3 most recent PHQ Screens 9/11/2015   Little interest or pleasure in doing things Several days   Feeling down, depressed, irritable, or hopeless Several days   Total Score PHQ 2 2       Learning Assessment:  Learning Assessment 4/25/2016   PRIMARY LEARNER Patient   HIGHEST LEVEL OF EDUCATION - PRIMARY LEARNER  4 YEARS OF COLLEGE   BARRIERS PRIMARY LEARNER NONE   CO-LEARNER CAREGIVER No   PRIMARY LANGUAGE ENGLISH    NEED No   LEARNER PREFERENCE PRIMARY DEMONSTRATION   ANSWERED BY PATIENT   RELATIONSHIP SELF       Abuse Screening:  Abuse Screening Questionnaire 4/25/2016   Do you ever feel afraid of your partner? N   Are you in a relationship with someone who physically or mentally threatens you? N   Is it safe for you to go home? Y       Health Maintenance reviewed and discussed and ordered per Provider. Health Maintenance Due   Topic Date Due    HPV Age 9Y-34Y (2 - Female 3-dose series) 08/23/2011    PAP AKA CERVICAL CYTOLOGY  08/04/2014    MEDICARE YEARLY EXAM  02/11/2019    Influenza Age 9 to Adult  08/01/2019   . Keyon Crawford is updated on all     Pt currently taking Antiplatelet therapy? no    Coordination of Care:  1. Have you been to the ER, urgent care clinic since your last visit? no  Hospitalized since your last visit? no    2. Have you seen or consulted any other health care providers outside of the [address] [place] [address] since your last visit? no Include any pap smears or colon screening.  n0

## 2019-11-02 LAB
ABSOLUTE BANDS, 67058: NORMAL
ABSOLUTE BLASTS: NORMAL
ABSOLUTE METAMYELOCYTES, 900360: NORMAL
ABSOLUTE MYELOCYTES: NORMAL
ABSOLUTE NRBC,ANRBC: NORMAL
ABSOLUTE PROMYELOCYTES: NORMAL
ALB/GLOBRATIO, 58C: 1.5 (CALC) (ref 1–2.5)
ALBUMIN SERPL-MCNC: 4 G/DL (ref 3.6–5.1)
ALP SERPL-CCNC: 32 U/L (ref 33–115)
ALT SERPL-CCNC: 13 U/L (ref 6–29)
AST SERPL W P-5'-P-CCNC: 15 U/L (ref 10–30)
BANDS,BANDS: NORMAL
BASOPHILS # BLD: 40 CELLS/UL (ref 0–200)
BASOPHILS NFR BLD: 0.6 %
BILIRUB SERPL-MCNC: 0.2 MG/DL (ref 0.2–1.2)
BLASTS,BLAST: NORMAL
BUN SERPL-MCNC: 10 MG/DL (ref 7–25)
BUN/CREATININE RATIO,BUCR: ABNORMAL (CALC) (ref 6–22)
CALCIUM SERPL-MCNC: 9.5 MG/DL (ref 8.6–10.2)
CHLORIDE SERPL-SCNC: 105 MMOL/L (ref 98–110)
CO2 SERPL-SCNC: 24 MMOL/L (ref 20–32)
COMMENT(S): NORMAL
CREAT SERPL-MCNC: 0.66 MG/DL (ref 0.5–1.1)
EOSINOPHIL # BLD: 99 CELLS/UL (ref 15–500)
EOSINOPHIL NFR BLD: 1.5 %
ERYTHROCYTE [DISTWIDTH] IN BLOOD BY AUTOMATED COUNT: 12.2 % (ref 11–15)
GLOBULIN,GLOB: 2.6 G/DL (CALC) (ref 1.9–3.7)
GLUCOSE SERPL-MCNC: 78 MG/DL (ref 65–99)
HCT VFR BLD AUTO: 37.3 % (ref 35–45)
HGB BLD-MCNC: 12.6 G/DL (ref 11.7–15.5)
LYMPHOCYTES # BLD: 1742 CELLS/UL (ref 850–3900)
LYMPHOCYTES NFR BLD: 26.4 %
MCH RBC QN AUTO: 30.4 PG (ref 27–33)
MCHC RBC AUTO-ENTMCNC: 33.8 G/DL (ref 32–36)
MCV RBC AUTO: 90.1 FL (ref 80–100)
METAMYELOCYTES,METAS: NORMAL
MONOCYTES # BLD: 554 CELLS/UL (ref 200–950)
MONOCYTES NFR BLD: 8.4 %
MYELOCYTES,MYELO: NORMAL
NEUTROPHILS # BLD AUTO: 4165 CELLS/UL (ref 1500–7800)
NEUTROPHILS # BLD: 63.1 %
NRBC: NORMAL
PLATELET # BLD AUTO: 350 THOUSAND/UL (ref 140–400)
PMV BLD AUTO: 10.8 FL (ref 7.5–12.5)
POTASSIUM SERPL-SCNC: 4.5 MMOL/L (ref 3.5–5.3)
PROMYELOCYTES,PRO: NORMAL
PROT SERPL-MCNC: 6.6 G/DL (ref 6.1–8.1)
RBC # BLD AUTO: 4.14 MILLION/UL (ref 3.8–5.1)
REACTIVE LYMPHS: NORMAL
SODIUM SERPL-SCNC: 139 MMOL/L (ref 135–146)
WBC # BLD AUTO: 6.6 THOUSAND/UL (ref 3.8–10.8)

## 2019-11-04 NOTE — PROGRESS NOTES
The patient presents to the office today for a check up    HPI    The patient presents to the office today for a check up. The patient remains on Prilosec for reflux and BCP . The patient has no complaints. The patient had been stressed over the loss of her father and issues from work. She has changed jobs. He is doing much better. The patient has limited her ethanol use. Review of Systems   Respiratory: Negative for shortness of breath. Cardiovascular: Negative for chest pain and leg swelling. No Known Allergies    Current Outpatient Medications   Medication Sig Dispense Refill    omeprazole (PRILOSEC) 40 mg capsule TAKE 1 CAPSULE BY MOUTH IN THE MORNING 30 Cap 3    SPRINTEC, 28, 0.25-35 mg-mcg tab          Past Medical History:   Diagnosis Date    Acute laryngitis, without mention of obstruction     Acute pharyngitis     Acute upper respiratory infections of unspecified site     Dizziness and giddiness     Other and unspecified noninfectious gastroenteritis and colitis(558.9)     Paresthesia        History reviewed. No pertinent surgical history.     Social History     Socioeconomic History    Marital status: UNKNOWN     Spouse name: Not on file    Number of children: Not on file    Years of education: Not on file    Highest education level: Not on file   Occupational History    Not on file   Social Needs    Financial resource strain: Not on file    Food insecurity:     Worry: Not on file     Inability: Not on file    Transportation needs:     Medical: Not on file     Non-medical: Not on file   Tobacco Use    Smoking status: Never Smoker    Smokeless tobacco: Never Used   Substance and Sexual Activity    Alcohol use: No    Drug use: No    Sexual activity: Yes     Partners: Male   Lifestyle    Physical activity:     Days per week: Not on file     Minutes per session: Not on file    Stress: Not on file   Relationships    Social connections:     Talks on phone: Not on file     Gets together: Not on file     Attends [employer] service: Not on file     Active member of club or organization: Not on file     Attends meetings of clubs or organizations: Not on file     Relationship status: Not on file    Intimate partner violence:     Fear of current or ex partner: Not on file     Emotionally abused: Not on file     Physically abused: Not on file     Forced sexual activity: Not on file   Other Topics Concern    Not on file   Social History Narrative    Not on file       Patient does not have an advanced directive on file    Visit Vitals  /70 (BP 1 Location: Left arm, BP Patient Position: Sitting)   Pulse 73   Temp 97.3 °F (36.3 °C)   Resp 12   Ht 5' 3\" (1.6 m)   Wt 185 lb (83.9 kg)   SpO2 97%   BMI 32.77 kg/m²       Physical Exam  No Cervical Lymphadenopathy  No Supraclavicular Lymphadenopathy  Thyroid is Normal  Lungs are normal to percussion. Clear to auscultation   Heart:  S1 S2 are normal, No gallops, No murmurs  No Carotid Bruits  Abdomen:  Normal Bowel Sounds. No tenderness. No masses. No Hepatomegaly or Splenomegaly  LE:  Strong Pedal Pulses.   No Edema        Office Visit on 11/01/2019   Component Date Value Ref Range Status    Glucose 11/01/2019 78  65 - 99 mg/dL Final    Comment:               Fasting reference interval         BUN 11/01/2019 10  7 - 25 mg/dL Final    Creatinine 11/01/2019 0.66  0.50 - 1.10 mg/dL Final    GFR est non-AA 11/01/2019 122  > OR = 60 mL/min/1.73m2 Final    GFR est AA 11/01/2019 141  > OR = 60 mL/min/1.73m2 Final    BUN/Creatinine ratio 79/16/2848 NOT APPLICABLE  6 - 22 (calc) Final    Sodium 11/01/2019 139  135 - 146 mmol/L Final    Potassium 11/01/2019 4.5  3.5 - 5.3 mmol/L Final    Chloride 11/01/2019 105  98 - 110 mmol/L Final    CO2 11/01/2019 24  20 - 32 mmol/L Final    Calcium 11/01/2019 9.5  8.6 - 10.2 mg/dL Final    Protein, total 11/01/2019 6.6  6.1 - 8.1 g/dL Final    Albumin 11/01/2019 4.0  3.6 - 5.1 g/dL Final  Globulin 11/01/2019 2.6  1.9 - 3.7 g/dL (calc) Final    ALB/GLOBRATIO 11/01/2019 1.5  1.0 - 2.5 (calc) Final    Bilirubin, total 11/01/2019 0.2  0.2 - 1.2 mg/dL Final    Alk. phosphatase 11/01/2019 32* 33 - 115 U/L Final    AST (SGOT) 11/01/2019 15  10 - 30 U/L Final    ALT (SGPT) 11/01/2019 13  6 - 29 U/L Final    WBC 11/01/2019 6.6  3.8 - 10.8 Thousand/uL Final    RBC 11/01/2019 4.14  3.80 - 5.10 Million/uL Final    HGB 11/01/2019 12.6  11.7 - 15.5 g/dL Final    HCT 11/01/2019 37.3  35.0 - 45.0 % Final    MCV 11/01/2019 90.1  80.0 - 100.0 fL Final    MCH 11/01/2019 30.4  27.0 - 33.0 pg Final    MCHC 11/01/2019 33.8  32.0 - 36.0 g/dL Final    RDW 11/01/2019 12.2  11.0 - 15.0 % Final    PLATELET 47/22/9165 031  140 - 400 Thousand/uL Final    MEAN PLATELET VOLUME 34/53/1785 10.8  7.5 - 12.5 fL Final    ABS. NEUTROPHILS 11/01/2019 4,165  1,500 - 7,800 cells/uL Final    ABSOLUTE BANDS 11/01/2019 CANCELED   Final    Result canceled by the ancillary.  ABSOLUTE METAMYELOCYTES 11/01/2019 CANCELED   Final    Result canceled by the ancillary.  ABSOLUTE MYELOCYTES 11/01/2019 CANCELED   Final    Result canceled by the ancillary.  ABSOLUTE PROMYELOCYTES 11/01/2019 CANCELED   Final    Result canceled by the ancillary.  ABS. LYMPHOCYTES 11/01/2019 1,742  850 - 3,900 cells/uL Final    ABS. MONOCYTES 11/01/2019 554  200 - 950 cells/uL Final    ABS. EOSINOPHILS 11/01/2019 99  15 - 500 cells/uL Final    ABS. BASOPHILS 11/01/2019 40  0 - 200 cells/uL Final    ABSOLUTE BLASTS 11/01/2019 CANCELED   Final    Result canceled by the ancillary.  ABSOLUTE NRBC 11/01/2019 CANCELED   Final    Result canceled by the ancillary.  Neutrophils 11/01/2019 63.1  % Final    BAND NEUTROPHILS 11/01/2019 CANCELED   Final    Result canceled by the ancillary.  METAMYELOCYTES 11/01/2019 CANCELED   Final    Result canceled by the ancillary.     MYELOCYTES 11/01/2019 CANCELED   Final    Result canceled by the ancillary.  PROMYELOCYTES 11/01/2019 CANCELED   Final    Result canceled by the ancillary.  LYMPHOCYTES 11/01/2019 26.4  % Final    REACTIVE LYMPHS 11/01/2019 CANCELED   Final    Result canceled by the ancillary.  MONOCYTES 11/01/2019 8.4  % Final    EOSINOPHILS 11/01/2019 1.5  % Final    BASOPHILS 11/01/2019 0.6  % Final    BLASTS 11/01/2019 CANCELED   Final    Result canceled by the ancillary.  NRBC 11/01/2019 CANCELED   Final    Result canceled by the ancillary.  COMMENT(S) 11/01/2019 CANCELED   Final    Result canceled by the ancillary. .  Results for orders placed or performed in visit on 80/61/31   METABOLIC PANEL, COMPREHENSIVE   Result Value Ref Range    Glucose 78 65 - 99 mg/dL    BUN 10 7 - 25 mg/dL    Creatinine 0.66 0.50 - 1.10 mg/dL    GFR est non- > OR = 60 mL/min/1.73m2    GFR est  > OR = 60 mL/min/1.73m2    BUN/Creatinine ratio NOT APPLICABLE 6 - 22 (calc)    Sodium 139 135 - 146 mmol/L    Potassium 4.5 3.5 - 5.3 mmol/L    Chloride 105 98 - 110 mmol/L    CO2 24 20 - 32 mmol/L    Calcium 9.5 8.6 - 10.2 mg/dL    Protein, total 6.6 6.1 - 8.1 g/dL    Albumin 4.0 3.6 - 5.1 g/dL    Globulin 2.6 1.9 - 3.7 g/dL (calc)    ALB/GLOBRATIO 1.5 1.0 - 2.5 (calc)    Bilirubin, total 0.2 0.2 - 1.2 mg/dL    Alk. phosphatase 32 (L) 33 - 115 U/L    AST (SGOT) 15 10 - 30 U/L    ALT (SGPT) 13 6 - 29 U/L    Narrative    Received Date:    [id number]   CBC WITH AUTOMATED DIFF   Result Value Ref Range    WBC 6.6 3.8 - 10.8 Thousand/uL    RBC 4.14 3.80 - 5.10 Million/uL    HGB 12.6 11.7 - 15.5 g/dL    HCT 37.3 35.0 - 45.0 %    MCV 90.1 80.0 - 100.0 fL    MCH 30.4 27.0 - 33.0 pg    MCHC 33.8 32.0 - 36.0 g/dL    RDW 12.2 11.0 - 15.0 %    PLATELET 274 830 - 385 Thousand/uL    MEAN PLATELET VOLUME 28.4 7.5 - 12.5 fL    ABS.  NEUTROPHILS 4,165 1,500 - 7,800 cells/uL    ABSOLUTE BANDS CANCELED     ABSOLUTE METAMYELOCYTES CANCELED     ABSOLUTE MYELOCYTES CANCELED     ABSOLUTE PROMYELOCYTES CANCELED     ABS. LYMPHOCYTES 1,742 850 - 3,900 cells/uL    ABS. MONOCYTES 554 200 - 950 cells/uL    ABS. EOSINOPHILS 99 15 - 500 cells/uL    ABS. BASOPHILS 40 0 - 200 cells/uL    ABSOLUTE BLASTS CANCELED     ABSOLUTE NRBC CANCELED     Neutrophils 63.1 %    BAND NEUTROPHILS CANCELED     METAMYELOCYTES CANCELED     MYELOCYTES CANCELED     PROMYELOCYTES CANCELED     LYMPHOCYTES 26.4 %    REACTIVE LYMPHS CANCELED     MONOCYTES 8.4 %    EOSINOPHILS 1.5 %    BASOPHILS 0.6 %    BLASTS CANCELED     NRBC CANCELED     COMMENT(S) CANCELED     Narrative    Received Date:    [id number]       Assessment / Plan      ICD-10-CM ICD-9-CM    1. Annual physical exam Z00.00 V70.0 CBC WITH AUTOMATED DIFF      METABOLIC PANEL, COMPREHENSIVE      METABOLIC PANEL, COMPREHENSIVE      CBC WITH AUTOMATED DIFF       I advised the patient to continue good health habits  she was advised to continue her maintenance medications  Las ordered    Follow-up and Dispositions    · Return in about 7 months (around 6/3/2020). I asked Sonja Chou if she has any questions and I answered the questions. Sonja Chou states that she understands the treatment plan and agrees with the treatment plan      THIS DOCUMENT WAS CREATED USING A [address] [employer].   THERE IS A POSSIBILTY OF TRANSCRIBING ERRORS

## 2019-11-13 ENCOUNTER — HOSPITAL ENCOUNTER (OUTPATIENT)
Dept: LAB | Age: 24
Discharge: HOME | End: 2019-11-13
Payer: COMMERCIAL

## 2019-11-13 ENCOUNTER — OFFICE VISIT (OUTPATIENT)
Dept: FAMILY MEDICINE CLINIC | Facility: CLINIC | Age: 24
End: 2019-11-13

## 2019-11-13 VITALS
HEIGHT: 63 IN | SYSTOLIC BLOOD PRESSURE: 110 MMHG | DIASTOLIC BLOOD PRESSURE: 62 MMHG | TEMPERATURE: 95.7 F | HEART RATE: 105 BPM | RESPIRATION RATE: 14 BRPM | WEIGHT: 180 LBS | OXYGEN SATURATION: 97 % | BODY MASS INDEX: 31.89 KG/M2

## 2019-11-13 DIAGNOSIS — Z98.890 POST-OPERATIVE STATE: Primary | ICD-10-CM

## 2019-11-13 DIAGNOSIS — Z87.19 HISTORY OF APPENDICITIS: ICD-10-CM

## 2019-11-13 DIAGNOSIS — R06.02 SHORTNESS OF BREATH: ICD-10-CM

## 2019-11-13 LAB
ALBUMIN SERPL-MCNC: 3.5 G/DL (ref 3.4–5)
ALBUMIN/GLOB SERPL: 0.9 {RATIO} (ref 0.8–1.7)
ALP SERPL-CCNC: 54 U/L (ref 45–117)
ALT SERPL-CCNC: 21 U/L (ref 13–56)
ANION GAP SERPL CALC-SCNC: 8 MMOL/L (ref 3–18)
AST SERPL-CCNC: 7 U/L (ref 10–38)
BASOPHILS # BLD: 0 K/UL (ref 0–0.1)
BASOPHILS NFR BLD: 0 % (ref 0–2)
BILIRUB SERPL-MCNC: 0.2 MG/DL (ref 0.2–1)
BUN SERPL-MCNC: 14 MG/DL (ref 7–18)
BUN/CREAT SERPL: 24 (ref 12–20)
CALCIUM SERPL-MCNC: 9.5 MG/DL (ref 8.5–10.1)
CHLORIDE SERPL-SCNC: 106 MMOL/L (ref 100–111)
CO2 SERPL-SCNC: 24 MMOL/L (ref 21–32)
CREAT SERPL-MCNC: 0.59 MG/DL (ref 0.6–1.3)
DIFFERENTIAL METHOD BLD: ABNORMAL
EOSINOPHIL # BLD: 0.1 K/UL (ref 0–0.4)
EOSINOPHIL NFR BLD: 1 % (ref 0–5)
ERYTHROCYTE [DISTWIDTH] IN BLOOD BY AUTOMATED COUNT: 13.4 % (ref 11.6–14.5)
GLOBULIN SER CALC-MCNC: 3.7 G/DL (ref 2–4)
GLUCOSE SERPL-MCNC: 103 MG/DL (ref 74–99)
HCT VFR BLD AUTO: 36.9 % (ref 35–45)
HGB BLD-MCNC: 11.5 G/DL (ref 12–16)
LYMPHOCYTES # BLD: 2.1 K/UL (ref 0.9–3.6)
LYMPHOCYTES NFR BLD: 14 % (ref 21–52)
MCH RBC QN AUTO: 29.1 PG (ref 24–34)
MCHC RBC AUTO-ENTMCNC: 31.2 G/DL (ref 31–37)
MCV RBC AUTO: 93.4 FL (ref 74–97)
MONOCYTES # BLD: 1 K/UL (ref 0.05–1.2)
MONOCYTES NFR BLD: 7 % (ref 3–10)
NEUTS SEG # BLD: 11.9 K/UL (ref 1.8–8)
NEUTS SEG NFR BLD: 78 % (ref 40–73)
PLATELET # BLD AUTO: 660 K/UL (ref 135–420)
PMV BLD AUTO: 10 FL (ref 9.2–11.8)
POTASSIUM SERPL-SCNC: 4.3 MMOL/L (ref 3.5–5.5)
PROT SERPL-MCNC: 7.2 G/DL (ref 6.4–8.2)
RBC # BLD AUTO: 3.95 M/UL (ref 4.2–5.3)
SODIUM SERPL-SCNC: 138 MMOL/L (ref 136–145)
WBC # BLD AUTO: 15.1 K/UL (ref 4.6–13.2)

## 2019-11-13 PROCEDURE — 85025 COMPLETE CBC W/AUTO DIFF WBC: CPT

## 2019-11-13 PROCEDURE — 80053 COMPREHEN METABOLIC PANEL: CPT

## 2019-11-13 PROCEDURE — 36415 COLL VENOUS BLD VENIPUNCTURE: CPT

## 2019-11-13 RX ORDER — OXYCODONE AND ACETAMINOPHEN 5; 325 MG/1; MG/1
1 TABLET ORAL
Qty: 28 TAB | Refills: 0 | Status: SHIPPED | OUTPATIENT
Start: 2019-11-13 | End: 2019-11-20

## 2019-11-13 RX ORDER — SENNOSIDES 8.6 MG/1
1 TABLET ORAL DAILY
COMMUNITY
End: 2019-11-20 | Stop reason: ALTCHOICE

## 2019-11-13 RX ORDER — OXYCODONE HYDROCHLORIDE 5 MG/1
5 TABLET ORAL
COMMUNITY
End: 2019-11-20 | Stop reason: ALTCHOICE

## 2019-11-13 RX ORDER — DOCUSATE SODIUM 100 MG/1
100 CAPSULE, LIQUID FILLED ORAL 2 TIMES DAILY
COMMUNITY
End: 2019-11-20 | Stop reason: ALTCHOICE

## 2019-11-13 RX ORDER — CEPHALEXIN 500 MG/1
CAPSULE ORAL
Qty: 25 CAP | Refills: 0 | Status: SHIPPED | OUTPATIENT
Start: 2019-11-13 | End: 2019-11-20 | Stop reason: ALTCHOICE

## 2019-11-13 NOTE — PROGRESS NOTES
Bridgett Villarreal presents today for   Chief Complaint   Patient presents with   [facility] Follow Up     appendicitis removal       Bridgett Villarreal preferred language for health care discussion is english. Is someone accompanying this pt? no    Is the patient using any DME equipment during [address]? no    Depression Screening:  3 most recent PHQ Screens 9/11/2015   Little interest or pleasure in doing things Several days   Feeling down, depressed, irritable, or hopeless Several days   Total Score PHQ 2 2       Learning Assessment:  Learning Assessment 4/25/2016   PRIMARY LEARNER Patient   HIGHEST LEVEL OF EDUCATION - PRIMARY LEARNER  4 YEARS OF COLLEGE   BARRIERS PRIMARY LEARNER NONE   CO-LEARNER CAREGIVER No   PRIMARY LANGUAGE ENGLISH    NEED No   LEARNER PREFERENCE PRIMARY DEMONSTRATION   ANSWERED BY PATIENT   RELATIONSHIP SELF       Abuse Screening:  Abuse Screening Questionnaire 4/25/2016   Do you ever feel afraid of your partner? N   Are you in a relationship with someone who physically or mentally threatens you? N   Is it safe for you to go home? Y       Fall Risk  No flowsheet data found. Health Maintenance reviewed and discussed and ordered per Provider. Health Maintenance Due   Topic Date Due    HPV Age 9Y-34Y (2 - Female 3-dose series) 08/23/2011    PAP AKA CERVICAL CYTOLOGY  08/04/2014    MEDICARE YEARLY EXAM  02/11/2019    Influenza Age 9 to Adult  08/01/2019   . Bridgett Villarreal is updated on all     Pt currently taking Antiplatelet therapy? no    Coordination of Care:  1. Have you been to the ER, urgent care clinic since your last visit? 10/2019 [facility] Hospitalized since your last visit? Yes appendicitis    2. Have you seen or consulted any other health care providers outside of the [address] [place] [address] since your last visit? no Include any pap smears or colon screening.  no

## 2019-11-13 NOTE — LETTER
19 RE:  Gabby Muse :  1995 To whom it may concern: 
 
 I am Xuan العلي's primary care physician. I last evaluated Zohra on 19. Please excuse Gabby Muse from work for the dates of 19 to 19 due to emergency surgery. Gabby Muse is able to return to work on 19 -- work limited to 4 hours per day. Lifting limit of 10 pounds. She should be able to work 8 hour days as of 19 but she should limit lifting to 10 pounds until 19 Sincerely, Leonarda Kirkpatrick M.D.   FACP

## 2019-11-14 RX ORDER — METRONIDAZOLE 250 MG/1
250 TABLET ORAL 3 TIMES DAILY
Qty: 30 TAB | Refills: 0 | Status: SHIPPED | OUTPATIENT
Start: 2019-11-14 | End: 2019-11-20 | Stop reason: ALTCHOICE

## 2019-11-18 NOTE — PROGRESS NOTES
The patient presents to the office today with the chief complaint of post op    HPI    The patient is 6 days post an emergency appendectomy for acute appendicitis. The appendix was not rupture but it had began to leak. The patient feels weak. She has mild to moderate abdominal pain. She notes slight drainage at incision at the umbilicus      Review of Systems   Respiratory: Negative for shortness of breath. Cardiovascular: Negative for chest pain and leg swelling. Gastrointestinal: Positive for abdominal pain. No Known Allergies    Current Outpatient Medications   Medication Sig Dispense Refill    docusate sodium (COLACE) 100 mg capsule Take 100 mg by mouth two (2) times a day.  oxyCODONE IR (ROXICODONE) 5 mg immediate release tablet Take 5 mg by mouth every four (4) hours as needed for Pain.  senna (SENNA) 8.6 mg tablet Take 1 Tab by mouth daily.  cephALEXin (KEFLEX) 500 mg capsule 1 cap three times per day 25 Cap 0    oxyCODONE-acetaminophen (PERCOCET) 5-325 mg per tablet Take 1 Tab by mouth every six (6) hours as needed for Pain for up to 7 days. Max Daily Amount: 4 Tabs. 28 Tab 0    omeprazole (PRILOSEC) 40 mg capsule TAKE 1 CAPSULE BY MOUTH IN THE MORNING 30 Cap 3    SPRINTEC, 28, 0.25-35 mg-mcg tab       metroNIDAZOLE (FLAGYL) 250 mg tablet Take 1 Tab by mouth three (3) times daily for 10 days. 30 Tab 0       Past Medical History:   Diagnosis Date    Acute laryngitis, without mention of obstruction     Acute pharyngitis     Acute upper respiratory infections of unspecified site     Dizziness and giddiness     Other and unspecified noninfectious gastroenteritis and colitis(558.9)     Paresthesia        History reviewed. No pertinent surgical history.     Social History     Socioeconomic History    Marital status: UNKNOWN     Spouse name: Not on file    Number of children: Not on file    Years of education: Not on file    Highest education level: Not on file Occupational History    Not on file   Social Needs    Financial resource strain: Not on file    Food insecurity:     Worry: Not on file     Inability: Not on file    Transportation needs:     Medical: Not on file     Non-medical: Not on file   Tobacco Use    Smoking status: Never Smoker    Smokeless tobacco: Never Used   Substance and Sexual Activity    Alcohol use: No    Drug use: No    Sexual activity: Yes     Partners: Male   Lifestyle    Physical activity:     Days per week: Not on file     Minutes per session: Not on file    Stress: Not on file   Relationships    Social connections:     Talks on phone: Not on file     Gets together: Not on file     Attends Buddhism service: Not on file     Active member of club or organization: Not on file     Attends meetings of clubs or organizations: Not on file     Relationship status: Not on file    Intimate partner violence:     Fear of current or ex partner: Not on file     Emotionally abused: Not on file     Physically abused: Not on file     Forced sexual activity: Not on file   Other Topics Concern    Not on file   Social History Narrative    Not on file       Patient does not have an advanced directive on file    Visit Vitals  /62 (BP 1 Location: Right arm, BP Patient Position: Sitting)   Pulse (!) 105   Temp 95.7 °F (35.4 °C) (Tympanic)   Resp 14   Ht 5' 3\" (1.6 m)   Wt 180 lb (81.6 kg)   SpO2 97%   BMI 31.89 kg/m²       Physical Exam   Skin:  3 incisions on the abdomen with a slight amount of serous drainage at the incision near the umbilicus  No Cervical Lymphadenopathy  No Supraclavicular Lymphadenopathy  Thyroid is Normal  Lungs are normal to percussion. Clear to auscultation   Heart:  S1 S2 are normal, No gallops, No murmurs  No Carotid Bruits  Abdomen:  Normal Bowel Sounds. Mild generalized tenderness wilt voluntary guarding,  without rebound. No masses. No Hepatomegaly or Splenomegaly  LE:  Strong Pedal Pulses.   No [facility]          Hospital Outpatient Visit on 11/13/2019   Component Date Value Ref Range Status    Sodium 11/13/2019 138  136 - 145 mmol/L Final    Potassium 11/13/2019 4.3  3.5 - 5.5 mmol/L Final    Chloride 11/13/2019 106  100 - 111 mmol/L Final    CO2 11/13/2019 24  21 - 32 mmol/L Final    Anion gap 11/13/2019 8  3.0 - 18 mmol/L Final    Glucose 11/13/2019 103* 74 - 99 mg/dL Final    BUN 11/13/2019 14  7.0 - 18 MG/DL Final    Creatinine 11/13/2019 0.59* 0.6 - 1.3 MG/DL Final    BUN/Creatinine ratio 11/13/2019 24* 12 - 20   Final    GFR est AA 11/13/2019 >60  >60 ml/min/1.73m2 Final    GFR est non-AA 11/13/2019 >60  >60 ml/min/1.73m2 Final    Comment: (NOTE)  Estimated GFR is calculated using the Modification of Diet in Renal   Disease (MDRD) Study equation, reported for both  Americans   (GFRAA) and non- Americans (GFRNA), and normalized to 1.73m2   body surface area. The physician must decide which value applies to   the patient. The MDRD study equation should only be used in   individuals age 25 or older. It has not been validated for the   following: pregnant women, patients with serious comorbid conditions,   or on certain medications, or persons with extremes of body size,   muscle mass, or nutritional status.  Calcium 11/13/2019 9.5  8.5 - 10.1 MG/DL Final    Bilirubin, total 11/13/2019 0.2  0.2 - 1.0 MG/DL Final    ALT (SGPT) 11/13/2019 21  13 - 56 U/L Final    AST (SGOT) 11/13/2019 7* 10 - 38 U/L Final    Alk.  phosphatase 11/13/2019 54  45 - 117 U/L Final    Protein, total 11/13/2019 7.2  6.4 - 8.2 g/dL Final    Albumin 11/13/2019 3.5  3.4 - 5.0 g/dL Final    Globulin 11/13/2019 3.7  2.0 - 4.0 g/dL Final    A-G Ratio 11/13/2019 0.9  0.8 - 1.7   Final    WBC 11/13/2019 15.1* 4.6 - 13.2 K/uL Final    RBC 11/13/2019 3.95* 4.20 - 5.30 M/uL Final    HGB 11/13/2019 11.5* 12.0 - 16.0 g/dL Final    HCT 11/13/2019 36.9  35.0 - 45.0 % Final    MCV 11/13/2019 93.4  74.0 - 97.0 FL Final    MCH 11/13/2019 29.1  24.0 - 34.0 PG Final    MCHC 11/13/2019 31.2  31.0 - 37.0 g/dL Final    RDW 11/13/2019 13.4  11.6 - 14.5 % Final    PLATELET 06/99/5435 691* 135 - 420 K/uL Final    MPV 11/13/2019 10.0  9.2 - 11.8 FL Final    NEUTROPHILS 11/13/2019 78* 40 - 73 % Final    LYMPHOCYTES 11/13/2019 14* 21 - 52 % Final    MONOCYTES 11/13/2019 7  3 - 10 % Final    EOSINOPHILS 11/13/2019 1  0 - 5 % Final    BASOPHILS 11/13/2019 0  0 - 2 % Final    ABS. NEUTROPHILS 11/13/2019 11.9* 1.8 - 8.0 K/UL Final    ABS. LYMPHOCYTES 11/13/2019 2.1  0.9 - 3.6 K/UL Final    ABS. MONOCYTES 11/13/2019 1.0  0.05 - 1.2 K/UL Final    ABS. EOSINOPHILS 11/13/2019 0.1  0.0 - 0.4 K/UL Final    ABS. BASOPHILS 11/13/2019 0.0  0.0 - 0.1 K/UL Final    DF 11/13/2019 AUTOMATED    Final   Office Visit on 11/01/2019   Component Date Value Ref Range Status    Glucose 11/01/2019 78  65 - 99 mg/dL Final    Comment:               Fasting reference interval         BUN 11/01/2019 10  7 - 25 mg/dL Final    Creatinine 11/01/2019 0.66  0.50 - 1.10 mg/dL Final    GFR est non-AA 11/01/2019 122  > OR = 60 mL/min/1.73m2 Final    GFR est AA 11/01/2019 141  > OR = 60 mL/min/1.73m2 Final    BUN/Creatinine ratio 95/46/7756 NOT APPLICABLE  6 - 22 (calc) Final    Sodium 11/01/2019 139  135 - 146 mmol/L Final    Potassium 11/01/2019 4.5  3.5 - 5.3 mmol/L Final    Chloride 11/01/2019 105  98 - 110 mmol/L Final    CO2 11/01/2019 24  20 - 32 mmol/L Final    Calcium 11/01/2019 9.5  8.6 - 10.2 mg/dL Final    Protein, total 11/01/2019 6.6  6.1 - 8.1 g/dL Final    Albumin 11/01/2019 4.0  3.6 - 5.1 g/dL Final    Globulin 11/01/2019 2.6  1.9 - 3.7 g/dL (calc) Final    ALB/GLOBRATIO 11/01/2019 1.5  1.0 - 2.5 (calc) Final    Bilirubin, total 11/01/2019 0.2  0.2 - 1.2 mg/dL Final    Alk.  phosphatase 11/01/2019 32* 33 - 115 U/L Final    AST (SGOT) 11/01/2019 15  10 - 30 U/L Final    ALT (SGPT) 11/01/2019 13  6 - 29 U/L Final    WBC 11/01/2019 6.6  3.8 - 10.8 Thousand/uL Final    RBC 11/01/2019 4.14  3.80 - 5.10 Million/uL Final    HGB 11/01/2019 12.6  11.7 - 15.5 g/dL Final    HCT 11/01/2019 37.3  35.0 - 45.0 % Final    MCV 11/01/2019 90.1  80.0 - 100.0 fL Final    MCH 11/01/2019 30.4  27.0 - 33.0 pg Final    MCHC 11/01/2019 33.8  32.0 - 36.0 g/dL Final    RDW 11/01/2019 12.2  11.0 - 15.0 % Final    PLATELET 40/40/8092 250  140 - 400 Thousand/uL Final    MEAN PLATELET VOLUME 50/15/5910 10.8  7.5 - 12.5 fL Final    ABS. NEUTROPHILS 11/01/2019 4,165  1,500 - 7,800 cells/uL Final    ABSOLUTE BANDS 11/01/2019 CANCELED   Final    Result canceled by the ancillary.  ABSOLUTE METAMYELOCYTES 11/01/2019 CANCELED   Final    Result canceled by the ancillary.  ABSOLUTE MYELOCYTES 11/01/2019 CANCELED   Final    Result canceled by the ancillary.  ABSOLUTE PROMYELOCYTES 11/01/2019 CANCELED   Final    Result canceled by the ancillary.  ABS. LYMPHOCYTES 11/01/2019 1,742  850 - 3,900 cells/uL Final    ABS. MONOCYTES 11/01/2019 554  200 - 950 cells/uL Final    ABS. EOSINOPHILS 11/01/2019 99  15 - 500 cells/uL Final    ABS. BASOPHILS 11/01/2019 40  0 - 200 cells/uL Final    ABSOLUTE BLASTS 11/01/2019 CANCELED   Final    Result canceled by the ancillary.  ABSOLUTE NRBC 11/01/2019 CANCELED   Final    Result canceled by the ancillary.  Neutrophils 11/01/2019 63.1  % Final    BAND NEUTROPHILS 11/01/2019 CANCELED   Final    Result canceled by the ancillary.  METAMYELOCYTES 11/01/2019 CANCELED   Final    Result canceled by the ancillary.  MYELOCYTES 11/01/2019 CANCELED   Final    Result canceled by the ancillary.  PROMYELOCYTES 11/01/2019 CANCELED   Final    Result canceled by the ancillary.  LYMPHOCYTES 11/01/2019 26.4  % Final    REACTIVE LYMPHS 11/01/2019 CANCELED   Final    Result canceled by the ancillary.     MONOCYTES 11/01/2019 8.4  % Final    EOSINOPHILS 11/01/2019 1.5  % Final    BASOPHILS 11/01/2019 0.6  % Final    BLASTS 11/01/2019 CANCELED   Final    Result canceled by the ancillary.  NRBC 11/01/2019 CANCELED   Final    Result canceled by the ancillary.  COMMENT(S) 11/01/2019 CANCELED   Final    Result canceled by the ancillary. .  Results for orders placed or performed during the hospital encounter of 81/91/66   METABOLIC PANEL, COMPREHENSIVE   Result Value Ref Range    Sodium 138 136 - 145 mmol/L    Potassium 4.3 3.5 - 5.5 mmol/L    Chloride 106 100 - 111 mmol/L    CO2 24 21 - 32 mmol/L    Anion gap 8 3.0 - 18 mmol/L    Glucose 103 (H) 74 - 99 mg/dL    BUN 14 7.0 - 18 MG/DL    Creatinine 0.59 (L) 0.6 - 1.3 MG/DL    BUN/Creatinine ratio 24 (H) 12 - 20      GFR est AA >60 >60 ml/min/1.73m2    GFR est non-AA >60 >60 ml/min/1.73m2    Calcium 9.5 8.5 - 10.1 MG/DL    Bilirubin, total 0.2 0.2 - 1.0 MG/DL    ALT (SGPT) 21 13 - 56 U/L    AST (SGOT) 7 (L) 10 - 38 U/L    Alk. phosphatase 54 45 - 117 U/L    Protein, total 7.2 6.4 - 8.2 g/dL    Albumin 3.5 3.4 - 5.0 g/dL    Globulin 3.7 2.0 - 4.0 g/dL    A-G Ratio 0.9 0.8 - 1.7     CBC WITH AUTOMATED DIFF   Result Value Ref Range    WBC 15.1 (H) 4.6 - 13.2 K/uL    RBC 3.95 (L) 4.20 - 5.30 M/uL    HGB 11.5 (L) 12.0 - 16.0 g/dL    HCT 36.9 35.0 - 45.0 %    MCV 93.4 74.0 - 97.0 FL    MCH 29.1 24.0 - 34.0 PG    MCHC 31.2 31.0 - 37.0 g/dL    RDW 13.4 11.6 - 14.5 %    PLATELET 815 (H) 801 - 420 K/uL    MPV 10.0 9.2 - 11.8 FL    NEUTROPHILS 78 (H) 40 - 73 %    LYMPHOCYTES 14 (L) 21 - 52 %    MONOCYTES 7 3 - 10 %    EOSINOPHILS 1 0 - 5 %    BASOPHILS 0 0 - 2 %    ABS. NEUTROPHILS 11.9 (H) 1.8 - 8.0 K/UL    ABS. LYMPHOCYTES 2.1 0.9 - 3.6 K/UL    ABS. MONOCYTES 1.0 0.05 - 1.2 K/UL    ABS. EOSINOPHILS 0.1 0.0 - 0.4 K/UL    ABS. BASOPHILS 0.0 0.0 - 0.1 K/UL    DF AUTOMATED         Assessment / Plan      ICD-10-CM ICD-9-CM    1. Post-operative state Z98.890 V45.89 oxyCODONE-acetaminophen (PERCOCET) 5-325 mg per tablet   2. Shortness of breath R06.02 786.05 D DIMER   3.  History of appendicitis Z87.19 V12.79 CBC WITH AUTOMATED DIFF      METABOLIC PANEL, COMPREHENSIVE      oxyCODONE-acetaminophen (PERCOCET) 5-325 mg per tablet       Begin Keflex for an apparent infection fo an incision  CBC, CMP ordered  she was advised to continue her maintenance medications      Follow-up and Dispositions    · Return in about 1 week (around 11/20/2019). I asked Daily Avery if she has any questions and I answered the questions. Daily Avery states that she understands the treatment plan and agrees with the treatment plan          THIS DOCUMENT WAS CREATED WITH A VOICE ACTIVATED DICTATION SYSTEM.   IT MAY CONTAIN TRANSCRIPTION ERRORS

## 2019-11-20 ENCOUNTER — HOSPITAL ENCOUNTER (OUTPATIENT)
Dept: LAB | Age: 24
Discharge: HOME | End: 2019-11-20
Payer: COMMERCIAL

## 2019-11-20 ENCOUNTER — OFFICE VISIT (OUTPATIENT)
Dept: FAMILY MEDICINE CLINIC | Facility: CLINIC | Age: 24
End: 2019-11-20

## 2019-11-20 VITALS
HEART RATE: 92 BPM | TEMPERATURE: 97 F | DIASTOLIC BLOOD PRESSURE: 65 MMHG | OXYGEN SATURATION: 98 % | HEIGHT: 63 IN | RESPIRATION RATE: 14 BRPM | WEIGHT: 180 LBS | BODY MASS INDEX: 31.89 KG/M2 | SYSTOLIC BLOOD PRESSURE: 116 MMHG

## 2019-11-20 DIAGNOSIS — Z98.890 POST-OPERATIVE STATE: ICD-10-CM

## 2019-11-20 DIAGNOSIS — Z87.19 HISTORY OF APPENDICITIS: ICD-10-CM

## 2019-11-20 DIAGNOSIS — K65.1 ABSCESS OF ABDOMINAL CAVITY (HCC): Primary | ICD-10-CM

## 2019-11-20 LAB
ALBUMIN SERPL-MCNC: 3.4 G/DL (ref 3.4–5)
ALBUMIN/GLOB SERPL: 0.8 {RATIO} (ref 0.8–1.7)
ALP SERPL-CCNC: 81 U/L (ref 45–117)
ALT SERPL-CCNC: 36 U/L (ref 13–56)
ANION GAP SERPL CALC-SCNC: 6 MMOL/L (ref 3–18)
AST SERPL-CCNC: 26 U/L (ref 10–38)
BASOPHILS # BLD: 0 K/UL (ref 0–0.1)
BASOPHILS NFR BLD: 0 % (ref 0–2)
BILIRUB SERPL-MCNC: 0.2 MG/DL (ref 0.2–1)
BUN SERPL-MCNC: 9 MG/DL (ref 7–18)
BUN/CREAT SERPL: 12 (ref 12–20)
CALCIUM SERPL-MCNC: 9.5 MG/DL (ref 8.5–10.1)
CHLORIDE SERPL-SCNC: 102 MMOL/L (ref 100–111)
CO2 SERPL-SCNC: 27 MMOL/L (ref 21–32)
CREAT SERPL-MCNC: 0.73 MG/DL (ref 0.6–1.3)
DIFFERENTIAL METHOD BLD: ABNORMAL
EOSINOPHIL # BLD: 0.1 K/UL (ref 0–0.4)
EOSINOPHIL NFR BLD: 1 % (ref 0–5)
ERYTHROCYTE [DISTWIDTH] IN BLOOD BY AUTOMATED COUNT: 12.9 % (ref 11.6–14.5)
GLOBULIN SER CALC-MCNC: 4.3 G/DL (ref 2–4)
GLUCOSE SERPL-MCNC: 122 MG/DL (ref 74–99)
HCT VFR BLD AUTO: 35.3 % (ref 35–45)
HGB BLD-MCNC: 11.1 G/DL (ref 12–16)
LYMPHOCYTES # BLD: 2.1 K/UL (ref 0.9–3.6)
LYMPHOCYTES NFR BLD: 19 % (ref 21–52)
MCH RBC QN AUTO: 29.4 PG (ref 24–34)
MCHC RBC AUTO-ENTMCNC: 31.4 G/DL (ref 31–37)
MCV RBC AUTO: 93.4 FL (ref 74–97)
MONOCYTES # BLD: 0.8 K/UL (ref 0.05–1.2)
MONOCYTES NFR BLD: 7 % (ref 3–10)
NEUTS SEG # BLD: 8.3 K/UL (ref 1.8–8)
NEUTS SEG NFR BLD: 73 % (ref 40–73)
PLATELET # BLD AUTO: 754 K/UL (ref 135–420)
PMV BLD AUTO: 10 FL (ref 9.2–11.8)
POTASSIUM SERPL-SCNC: 4.4 MMOL/L (ref 3.5–5.5)
PROT SERPL-MCNC: 7.7 G/DL (ref 6.4–8.2)
RBC # BLD AUTO: 3.78 M/UL (ref 4.2–5.3)
SODIUM SERPL-SCNC: 135 MMOL/L (ref 136–145)
WBC # BLD AUTO: 11.2 K/UL (ref 4.6–13.2)

## 2019-11-20 PROCEDURE — 80053 COMPREHEN METABOLIC PANEL: CPT

## 2019-11-20 PROCEDURE — 85025 COMPLETE CBC W/AUTO DIFF WBC: CPT

## 2019-11-20 PROCEDURE — 36415 COLL VENOUS BLD VENIPUNCTURE: CPT

## 2019-11-20 RX ORDER — AMOXICILLIN AND CLAVULANATE POTASSIUM 875; 125 MG/1; MG/1
1 TABLET, FILM COATED ORAL
COMMUNITY
Start: 2019-11-17 | End: 2019-11-27 | Stop reason: ALTCHOICE

## 2019-11-20 RX ORDER — FLUCONAZOLE 200 MG/1
200 TABLET ORAL
COMMUNITY
Start: 2019-11-17 | End: 2019-11-27 | Stop reason: ALTCHOICE

## 2019-11-20 NOTE — PROGRESS NOTES
Kelvin Prader presents today for   Chief Complaint   Patient presents with   [facility] Follow Up       Kelvin Prader preferred language for health care discussion is english. Is someone accompanying this pt? Yes mother    Is the patient using any DME equipment during [address]? no    Depression Screening:  3 most recent PHQ Screens 9/11/2015   Little interest or pleasure in doing things Several days   Feeling down, depressed, irritable, or hopeless Several days   Total Score PHQ 2 2       Learning Assessment:  Learning Assessment 4/25/2016   PRIMARY LEARNER Patient   HIGHEST LEVEL OF EDUCATION - PRIMARY LEARNER  4 YEARS OF COLLEGE   BARRIERS PRIMARY LEARNER NONE   CO-LEARNER CAREGIVER No   PRIMARY LANGUAGE ENGLISH    NEED No   LEARNER PREFERENCE PRIMARY DEMONSTRATION   ANSWERED BY PATIENT   RELATIONSHIP SELF       Abuse Screening:  Abuse Screening Questionnaire 4/25/2016   Do you ever feel afraid of your partner? N   Are you in a relationship with someone who physically or mentally threatens you? N   Is it safe for you to go home? Y       Fall Risk  No flowsheet data found. Health Maintenance reviewed and discussed and ordered per Provider. Health Maintenance Due   Topic Date Due    HPV Age 9Y-34Y (2 - Female 3-dose series) 08/23/2011    PAP AKA CERVICAL CYTOLOGY  08/04/2014    MEDICARE YEARLY EXAM  02/11/2019    Influenza Age 9 to Adult  08/01/2019   . Nir Prader is updated on all     Pt currently taking Antiplatelet therapy? no    Coordination of Care:  1. Have you been to the ER, urgent care clinic since your last visit? Yes [facility] Hospitalized since your last visit? Yes infection from previous surgery 11/19    2. Have you seen or consulted any other health care providers outside of the [address] [place] [address] since your last visit? no Include any pap smears or colon screening.  no

## 2019-11-20 NOTE — LETTER
19 RE:  Randa Saran : 1995 To whom it may concern: 
 
 I am Simran العلي's primary care physician. I last evaluated MsDeneen Zohra at the office today. Ms Danisha Venegas will be able to resume work on [employer]" limited to 4 hour work days working from home. This work restriction needs to be in place until the patient is reevaluated on 19. Sincerely, Aly Ramos M.D.   FACP

## 2019-11-21 NOTE — PROGRESS NOTES
The patient presents to the office today with the chief complaint of abdominal pain    HPI    The patient is status post an emergency appendectomy for appendicitis - at the time of surgery the appendix had not ruptured but \"was leaking.:  The patient did well for 4 days post op. She then developed increasing abdominal pain, fever, leukocytosis and drainage from the umbilical incision. She was admitted to the hospital.  CT scanning showed several small fluid collections with a larger one near the right ovary - possible ovarian cyst  The patient had one blood culture positive for gram + cocci in the anaerobic bottle. This is of uncertain significance. The patient was treated with IV antibiotics and then switched to oral Augmentin. Since discharge the patient has not had fever. She persists with pain in her right lower abdomen which is slowly increasing. The patiet notes pain in her right ar with deep breaths or coughs. The patient is fatigued. Review of Systems   Constitutional: Negative for fever. Respiratory: Negative for shortness of breath. Cardiovascular: Negative for chest pain and leg swelling. Gastrointestinal: Positive for abdominal pain. No Known Allergies    Current Outpatient Medications   Medication Sig Dispense Refill    amoxicillin-clavulanate (AUGMENTIN) 875-125 mg per tablet Take 1 Tab by mouth.  fluconazole (DIFLUCAN) 200 mg tablet Take 200 mg by mouth.  oxyCODONE-acetaminophen (PERCOCET) 5-325 mg per tablet Take 1 Tab by mouth every six (6) hours as needed for Pain for up to 7 days. Max Daily Amount: 4 Tabs.  28 Tab 0    omeprazole (PRILOSEC) 40 mg capsule TAKE 1 CAPSULE BY MOUTH IN THE MORNING 30 Cap 3       Past Medical History:   Diagnosis Date    Acute laryngitis, without mention of obstruction     Acute pharyngitis     Acute upper respiratory infections of unspecified site     Dizziness and giddiness     Other and unspecified noninfectious gastroenteritis and colitis(558.9)     Paresthesia        Past Surgical History:   Procedure Laterality Date    HX APPENDECTOMY  11/2019       Social History     Socioeconomic History    Marital status: UNKNOWN     Spouse name: Not on file    Number of children: Not on file    Years of education: Not on file    Highest education level: Not on file   Occupational History    Not on file   Social Needs    Financial resource strain: Not on file    Food insecurity:     Worry: Not on file     Inability: Not on file    Transportation needs:     Medical: Not on file     Non-medical: Not on file   Tobacco Use    Smoking status: Never Smoker    Smokeless tobacco: Never Used   Substance and Sexual Activity    Alcohol use: No    Drug use: No    Sexual activity: Yes     Partners: Male   Lifestyle    Physical activity:     Days per week: Not on file     Minutes per session: Not on file    Stress: Not on file   Relationships    Social connections:     Talks on phone: Not on file     Gets together: Not on file     Attends Zoroastrian service: Not on file     Active member of club or organization: Not on file     Attends meetings of clubs or organizations: Not on file     Relationship status: Not on file    Intimate partner violence:     Fear of current or ex partner: Not on file     Emotionally abused: Not on file     Physically abused: Not on file     Forced sexual activity: Not on file   Other Topics Concern    Not on file   Social History Narrative    Not on file       Patient does not have an advanced directive on file    Visit Vitals  /65 (BP 1 Location: Right arm, BP Patient Position: Sitting)   Pulse 92   Temp 97 °F (36.1 °C) (Tympanic)   Resp 14   Ht 5' 3\" (1.6 m)   Wt 180 lb (81.6 kg)   SpO2 98%   BMI 31.89 kg/m²       Physical Exam  No Cervical Lymphadenopathy  No Supraclavicular Lymphadenopathy  Thyroid is Normal  Lungs are normal to percussion.   Clear to auscultation   Heart:  S1 S2 are normal, No gallops, No murmurs  No Carotid Bruits  Abdomen:  Normal Bowel Sounds. Moderate tenderness right lower abdomen. Voluntary guarding. An element of rebound (referred pain) is present . No masses. No Hepatomegaly or Splenomegaly  LE:  Strong Pedal Pulses. No Edema      Hospital Outpatient Visit on 11/13/2019   Component Date Value Ref Range Status    Sodium 11/13/2019 138  136 - 145 mmol/L Final    Potassium 11/13/2019 4.3  3.5 - 5.5 mmol/L Final    Chloride 11/13/2019 106  100 - 111 mmol/L Final    CO2 11/13/2019 24  21 - 32 mmol/L Final    Anion gap 11/13/2019 8  3.0 - 18 mmol/L Final    Glucose 11/13/2019 103* 74 - 99 mg/dL Final    BUN 11/13/2019 14  7.0 - 18 MG/DL Final    Creatinine 11/13/2019 0.59* 0.6 - 1.3 MG/DL Final    BUN/Creatinine ratio 11/13/2019 24* 12 - 20   Final    GFR est AA 11/13/2019 >60  >60 ml/min/1.73m2 Final    GFR est non-AA 11/13/2019 >60  >60 ml/min/1.73m2 Final    Comment: (NOTE)  Estimated GFR is calculated using the Modification of Diet in Renal   Disease (MDRD) Study equation, reported for both  Americans   (GFRAA) and non- Americans (GFRNA), and normalized to 1.73m2   body surface area. The physician must decide which value applies to   the patient. The MDRD study equation should only be used in   individuals age 25 or older. It has not been validated for the   following: pregnant women, patients with serious comorbid conditions,   or on certain medications, or persons with extremes of body size,   muscle mass, or nutritional status.  Calcium 11/13/2019 9.5  8.5 - 10.1 MG/DL Final    Bilirubin, total 11/13/2019 0.2  0.2 - 1.0 MG/DL Final    ALT (SGPT) 11/13/2019 21  13 - 56 U/L Final    AST (SGOT) 11/13/2019 7* 10 - 38 U/L Final    Alk.  phosphatase 11/13/2019 54  45 - 117 U/L Final    Protein, total 11/13/2019 7.2  6.4 - 8.2 g/dL Final    Albumin 11/13/2019 3.5  3.4 - 5.0 g/dL Final    Globulin 11/13/2019 3.7  2.0 - 4.0 g/dL Final    A-G Ratio 11/13/2019 0.9  0.8 - 1.7   Final    WBC 11/13/2019 15.1* 4.6 - 13.2 K/uL Final    RBC 11/13/2019 3.95* 4.20 - 5.30 M/uL Final    HGB 11/13/2019 11.5* 12.0 - 16.0 g/dL Final    HCT 11/13/2019 36.9  35.0 - 45.0 % Final    MCV 11/13/2019 93.4  74.0 - 97.0 FL Final    MCH 11/13/2019 29.1  24.0 - 34.0 PG Final    MCHC 11/13/2019 31.2  31.0 - 37.0 g/dL Final    RDW 11/13/2019 13.4  11.6 - 14.5 % Final    PLATELET 86/96/1752 365* 135 - 420 K/uL Final    MPV 11/13/2019 10.0  9.2 - 11.8 FL Final    NEUTROPHILS 11/13/2019 78* 40 - 73 % Final    LYMPHOCYTES 11/13/2019 14* 21 - 52 % Final    MONOCYTES 11/13/2019 7  3 - 10 % Final    EOSINOPHILS 11/13/2019 1  0 - 5 % Final    BASOPHILS 11/13/2019 0  0 - 2 % Final    ABS. NEUTROPHILS 11/13/2019 11.9* 1.8 - 8.0 K/UL Final    ABS. LYMPHOCYTES 11/13/2019 2.1  0.9 - 3.6 K/UL Final    ABS. MONOCYTES 11/13/2019 1.0  0.05 - 1.2 K/UL Final    ABS. EOSINOPHILS 11/13/2019 0.1  0.0 - 0.4 K/UL Final    ABS.  BASOPHILS 11/13/2019 0.0  0.0 - 0.1 K/UL Final    DF 11/13/2019 AUTOMATED    Final   Office Visit on 11/01/2019   Component Date Value Ref Range Status    Glucose 11/01/2019 78  65 - 99 mg/dL Final    Comment:               Fasting reference interval         BUN 11/01/2019 10  7 - 25 mg/dL Final    Creatinine 11/01/2019 0.66  0.50 - 1.10 mg/dL Final    GFR est non-AA 11/01/2019 122  > OR = 60 mL/min/1.73m2 Final    GFR est AA 11/01/2019 141  > OR = 60 mL/min/1.73m2 Final    BUN/Creatinine ratio 46/59/8524 NOT APPLICABLE  6 - 22 (calc) Final    Sodium 11/01/2019 139  135 - 146 mmol/L Final    Potassium 11/01/2019 4.5  3.5 - 5.3 mmol/L Final    Chloride 11/01/2019 105  98 - 110 mmol/L Final    CO2 11/01/2019 24  20 - 32 mmol/L Final    Calcium 11/01/2019 9.5  8.6 - 10.2 mg/dL Final    Protein, total 11/01/2019 6.6  6.1 - 8.1 g/dL Final    Albumin 11/01/2019 4.0  3.6 - 5.1 g/dL Final    Globulin 11/01/2019 2.6  1.9 - 3.7 g/dL (calc) Final    ALB/GLOBRATIO 11/01/2019 1.5  1.0 - 2.5 (calc) Final    Bilirubin, total 11/01/2019 0.2  0.2 - 1.2 mg/dL Final    Alk. phosphatase 11/01/2019 32* 33 - 115 U/L Final    AST (SGOT) 11/01/2019 15  10 - 30 U/L Final    ALT (SGPT) 11/01/2019 13  6 - 29 U/L Final    WBC 11/01/2019 6.6  3.8 - 10.8 Thousand/uL Final    RBC 11/01/2019 4.14  3.80 - 5.10 Million/uL Final    HGB 11/01/2019 12.6  11.7 - 15.5 g/dL Final    HCT 11/01/2019 37.3  35.0 - 45.0 % Final    MCV 11/01/2019 90.1  80.0 - 100.0 fL Final    MCH 11/01/2019 30.4  27.0 - 33.0 pg Final    MCHC 11/01/2019 33.8  32.0 - 36.0 g/dL Final    RDW 11/01/2019 12.2  11.0 - 15.0 % Final    PLATELET 50/22/1909 203  140 - 400 Thousand/uL Final    MEAN PLATELET VOLUME 40/54/7289 10.8  7.5 - 12.5 fL Final    ABS. NEUTROPHILS 11/01/2019 4,165  1,500 - 7,800 cells/uL Final    ABSOLUTE BANDS 11/01/2019 CANCELED   Final    Result canceled by the ancillary.  ABSOLUTE METAMYELOCYTES 11/01/2019 CANCELED   Final    Result canceled by the ancillary.  ABSOLUTE MYELOCYTES 11/01/2019 CANCELED   Final    Result canceled by the ancillary.  ABSOLUTE PROMYELOCYTES 11/01/2019 CANCELED   Final    Result canceled by the ancillary.  ABS. LYMPHOCYTES 11/01/2019 1,742  850 - 3,900 cells/uL Final    ABS. MONOCYTES 11/01/2019 554  200 - 950 cells/uL Final    ABS. EOSINOPHILS 11/01/2019 99  15 - 500 cells/uL Final    ABS. BASOPHILS 11/01/2019 40  0 - 200 cells/uL Final    ABSOLUTE BLASTS 11/01/2019 CANCELED   Final    Result canceled by the ancillary.  ABSOLUTE NRBC 11/01/2019 CANCELED   Final    Result canceled by the ancillary.  Neutrophils 11/01/2019 63.1  % Final    BAND NEUTROPHILS 11/01/2019 CANCELED   Final    Result canceled by the ancillary.  METAMYELOCYTES 11/01/2019 CANCELED   Final    Result canceled by the ancillary.  MYELOCYTES 11/01/2019 CANCELED   Final    Result canceled by the ancillary.     PROMYELOCYTES 11/01/2019 CANCELED   Final Result canceled by the ancillary.  LYMPHOCYTES 11/01/2019 26.4  % Final    REACTIVE LYMPHS 11/01/2019 CANCELED   Final    Result canceled by the ancillary.  MONOCYTES 11/01/2019 8.4  % Final    EOSINOPHILS 11/01/2019 1.5  % Final    BASOPHILS 11/01/2019 0.6  % Final    BLASTS 11/01/2019 CANCELED   Final    Result canceled by the ancillary.  NRBC 11/01/2019 CANCELED   Final    Result canceled by the ancillary.  COMMENT(S) 11/01/2019 CANCELED   Final    Result canceled by the ancillary. .No results found for any visits on 11/20/19. Assessment / Plan      ICD-10-CM ICD-9-CM    1. Abscess of abdominal cavity (HCC) K65.1 567.22 CBC WITH AUTOMATED DIFF      METABOLIC PANEL, COMPREHENSIVE      METABOLIC PANEL, COMPREHENSIVE      CBC WITH AUTOMATED DIFF   2. History of appendicitis Z87.19 V12.79 CBC WITH AUTOMATED DIFF      METABOLIC PANEL, COMPREHENSIVE      METABOLIC PANEL, COMPREHENSIVE      CBC WITH AUTOMATED DIFF   3. Post-operative state Z98.890 V45.89 CBC WITH AUTOMATED DIFF      METABOLIC PANEL, COMPREHENSIVE      METABOLIC PANEL, COMPREHENSIVE      CBC WITH AUTOMATED DIFF       Labs ordered  Patient may need a follow up CT scan  she was advised to continue her maintenance medications  Back to general surgery    Follow-up and Dispositions    · Return in about 7 years (around 11/20/2026). I asked Isela Sandoval if she has any questions and I answered the questions. Isela Sandoval states that she understands the treatment plan and agrees with the treatment plan          THIS DOCUMENT WAS CREATED WITH A VOICE ACTIVATED DICTATION SYSTEM.   IT MAY CONTAIN TRANSCRIPTION ERRORS

## 2019-11-27 ENCOUNTER — HOSPITAL ENCOUNTER (OUTPATIENT)
Dept: LAB | Age: 24
Discharge: HOME | End: 2019-11-27
Payer: COMMERCIAL

## 2019-11-27 ENCOUNTER — OFFICE VISIT (OUTPATIENT)
Dept: FAMILY MEDICINE CLINIC | Facility: CLINIC | Age: 24
End: 2019-11-27

## 2019-11-27 VITALS
SYSTOLIC BLOOD PRESSURE: 130 MMHG | HEART RATE: 84 BPM | BODY MASS INDEX: 31.89 KG/M2 | DIASTOLIC BLOOD PRESSURE: 76 MMHG | HEIGHT: 63 IN | RESPIRATION RATE: 14 BRPM | OXYGEN SATURATION: 98 % | TEMPERATURE: 98.5 F

## 2019-11-27 DIAGNOSIS — Z87.19 HISTORY OF APPENDICITIS: ICD-10-CM

## 2019-11-27 DIAGNOSIS — K65.1 ABSCESS OF ABDOMINAL CAVITY (HCC): ICD-10-CM

## 2019-11-27 DIAGNOSIS — Z87.19 HISTORY OF APPENDICITIS: Primary | ICD-10-CM

## 2019-11-27 LAB
ANION GAP SERPL CALC-SCNC: 5 MMOL/L (ref 3–18)
BASOPHILS # BLD: 0.1 K/UL (ref 0–0.1)
BASOPHILS NFR BLD: 1 % (ref 0–2)
BUN SERPL-MCNC: 11 MG/DL (ref 7–18)
BUN/CREAT SERPL: 15 (ref 12–20)
CALCIUM SERPL-MCNC: 9.1 MG/DL (ref 8.5–10.1)
CHLORIDE SERPL-SCNC: 107 MMOL/L (ref 100–111)
CO2 SERPL-SCNC: 25 MMOL/L (ref 21–32)
CREAT SERPL-MCNC: 0.73 MG/DL (ref 0.6–1.3)
DIFFERENTIAL METHOD BLD: ABNORMAL
EOSINOPHIL # BLD: 0.1 K/UL (ref 0–0.4)
EOSINOPHIL NFR BLD: 1 % (ref 0–5)
ERYTHROCYTE [DISTWIDTH] IN BLOOD BY AUTOMATED COUNT: 13.3 % (ref 11.6–14.5)
GLUCOSE SERPL-MCNC: 82 MG/DL (ref 74–99)
HCT VFR BLD AUTO: 36.5 % (ref 35–45)
HGB BLD-MCNC: 11.5 G/DL (ref 12–16)
LYMPHOCYTES # BLD: 2.2 K/UL (ref 0.9–3.6)
LYMPHOCYTES NFR BLD: 25 % (ref 21–52)
MCH RBC QN AUTO: 29 PG (ref 24–34)
MCHC RBC AUTO-ENTMCNC: 31.5 G/DL (ref 31–37)
MCV RBC AUTO: 91.9 FL (ref 74–97)
MONOCYTES # BLD: 0.5 K/UL (ref 0.05–1.2)
MONOCYTES NFR BLD: 5 % (ref 3–10)
NEUTS SEG # BLD: 6 K/UL (ref 1.8–8)
NEUTS SEG NFR BLD: 68 % (ref 40–73)
PLATELET # BLD AUTO: 515 K/UL (ref 135–420)
PMV BLD AUTO: 10.4 FL (ref 9.2–11.8)
POTASSIUM SERPL-SCNC: 4 MMOL/L (ref 3.5–5.5)
RBC # BLD AUTO: 3.97 M/UL (ref 4.2–5.3)
SODIUM SERPL-SCNC: 137 MMOL/L (ref 136–145)
WBC # BLD AUTO: 8.8 K/UL (ref 4.6–13.2)

## 2019-11-27 PROCEDURE — 36415 COLL VENOUS BLD VENIPUNCTURE: CPT

## 2019-11-27 PROCEDURE — 80048 BASIC METABOLIC PNL TOTAL CA: CPT

## 2019-11-27 PROCEDURE — 85025 COMPLETE CBC W/AUTO DIFF WBC: CPT

## 2019-11-27 RX ORDER — NORGESTIMATE AND ETHINYL ESTRADIOL 0.25-0.035
1 KIT ORAL DAILY
COMMUNITY

## 2019-11-27 RX ORDER — DICLOFENAC SODIUM 50 MG/1
TABLET, DELAYED RELEASE ORAL
Qty: 90 TAB | Refills: 0 | Status: SHIPPED | OUTPATIENT
Start: 2019-11-27 | End: 2019-12-09 | Stop reason: ALTCHOICE

## 2019-11-27 NOTE — LETTER
19 RE:  Cory Nelson : 1995 To whom it may concern: 
 
 I am Norma العلي's primary care physician. I last evaluated Ms. Irma Montes De Oca today. Ms. Irma Montes De Oca needs to remain on light duty and work from home until 19. Thank you Sincerely, Magalie Mendiola M.D.   FACP

## 2019-12-01 NOTE — PROGRESS NOTES
The patient presents to the office today with the chief complaint of chest pain    HPI    The patient persists with pleuritic pain right upper back. She also has pain in her right abdomen. There has been no fever. A recent CTA of the chest revealed no clots. A CT scan of the abdomen showed a 2.1 cm abscess - small in size with some surrounding inflammation. The patient is off antibiotics. She has normal frequency of stools but they are loose. Review of Systems   Respiratory: Negative for shortness of breath. Cardiovascular: Positive for chest pain. Negative for leg swelling. Gastrointestinal: Positive for abdominal pain and diarrhea. No Known Allergies    Current Outpatient Medications   Medication Sig Dispense Refill    norgestimate-ethinyl estradiol (SPRINTEC, 28,) 0.25-35 mg-mcg tab Take 1 Tab by mouth daily.       diclofenac EC (VOLTAREN) 50 mg EC tablet 1 tab three times per day with food 90 Tab 0    omeprazole (PRILOSEC) 40 mg capsule TAKE 1 CAPSULE BY MOUTH IN THE MORNING 30 Cap 3       Past Medical History:   Diagnosis Date    Acute laryngitis, without mention of obstruction     Acute pharyngitis     Acute upper respiratory infections of unspecified site     Dizziness and giddiness     Other and unspecified noninfectious gastroenteritis and colitis(558.9)     Paresthesia        Past Surgical History:   Procedure Laterality Date    HX APPENDECTOMY  11/2019       Social History     Socioeconomic History    Marital status: UNKNOWN     Spouse name: Not on file    Number of children: Not on file    Years of education: Not on file    Highest education level: Not on file   Occupational History    Not on file   Social Needs    Financial resource strain: Not on file    Food insecurity:     Worry: Not on file     Inability: Not on file    Transportation needs:     Medical: Not on file     Non-medical: Not on file   Tobacco Use    Smoking status: Never Smoker    Smokeless tobacco: Never Used   Substance and Sexual Activity    Alcohol use: No    Drug use: No    Sexual activity: Yes     Partners: Male   Lifestyle    Physical activity:     Days per week: Not on file     Minutes per session: Not on file    Stress: Not on file   Relationships    Social connections:     Talks on phone: Not on file     Gets together: Not on file     Attends Hindu service: Not on file     Active member of club or organization: Not on file     Attends meetings of clubs or organizations: Not on file     Relationship status: Not on file    Intimate partner violence:     Fear of current or ex partner: Not on file     Emotionally abused: Not on file     Physically abused: Not on file     Forced sexual activity: Not on file   Other Topics Concern    Not on file   Social History Narrative    Not on file       Patient does not have an advanced directive on file    Visit Vitals  /76 (BP 1 Location: Left arm, BP Patient Position: Sitting)   Pulse 84   Temp 98.5 °F (36.9 °C) (Tympanic)   Resp 14   Ht 5' 3\" (1.6 m)   SpO2 98%   BMI 31.89 kg/m²       Physical Exam  No Cervical Lymphadenopathy  No Supraclavicular Lymphadenopathy  Thyroid is Normal  Lungs are normal to percussion. Clear to auscultation. No rubs  Heart:  S1 S2 are normal, No gallops, No murmurs  No Carotid Bruits  Abdomen:  Normal Bowel Sounds. Mild right sided tenderness. No masses. No Hepatomegaly or Splenomegaly  LE:  Strong Pedal Pulses.   No Edema      Hospital Outpatient Visit on 11/27/2019   Component Date Value Ref Range Status    WBC 11/27/2019 8.8  4.6 - 13.2 K/uL Final    RBC 11/27/2019 3.97* 4.20 - 5.30 M/uL Final    HGB 11/27/2019 11.5* 12.0 - 16.0 g/dL Final    HCT 11/27/2019 36.5  35.0 - 45.0 % Final    MCV 11/27/2019 91.9  74.0 - 97.0 FL Final    MCH 11/27/2019 29.0  24.0 - 34.0 PG Final    MCHC 11/27/2019 31.5  31.0 - 37.0 g/dL Final    RDW 11/27/2019 13.3  11.6 - 14.5 % Final    PLATELET 45/78/7290 868* 135 - 420 K/uL Final    MPV 11/27/2019 10.4  9.2 - 11.8 FL Final    NEUTROPHILS 11/27/2019 68  40 - 73 % Final    LYMPHOCYTES 11/27/2019 25  21 - 52 % Final    MONOCYTES 11/27/2019 5  3 - 10 % Final    EOSINOPHILS 11/27/2019 1  0 - 5 % Final    BASOPHILS 11/27/2019 1  0 - 2 % Final    ABS. NEUTROPHILS 11/27/2019 6.0  1.8 - 8.0 K/UL Final    ABS. LYMPHOCYTES 11/27/2019 2.2  0.9 - 3.6 K/UL Final    ABS. MONOCYTES 11/27/2019 0.5  0.05 - 1.2 K/UL Final    ABS. EOSINOPHILS 11/27/2019 0.1  0.0 - 0.4 K/UL Final    ABS. BASOPHILS 11/27/2019 0.1  0.0 - 0.1 K/UL Final    DF 11/27/2019 AUTOMATED    Final    Sodium 11/27/2019 137  136 - 145 mmol/L Final    Potassium 11/27/2019 4.0  3.5 - 5.5 mmol/L Final    Chloride 11/27/2019 107  100 - 111 mmol/L Final    CO2 11/27/2019 25  21 - 32 mmol/L Final    Anion gap 11/27/2019 5  3.0 - 18 mmol/L Final    Glucose 11/27/2019 82  74 - 99 mg/dL Final    BUN 11/27/2019 11  7.0 - 18 MG/DL Final    Creatinine 11/27/2019 0.73  0.6 - 1.3 MG/DL Final    BUN/Creatinine ratio 11/27/2019 15  12 - 20   Final    GFR est AA 11/27/2019 >60  >60 ml/min/1.73m2 Final    GFR est non-AA 11/27/2019 >60  >60 ml/min/1.73m2 Final    Comment: (NOTE)  Estimated GFR is calculated using the Modification of Diet in Renal   Disease (MDRD) Study equation, reported for both  Americans   (GFRAA) and non- Americans (GFRNA), and normalized to 1.73m2   body surface area. The physician must decide which value applies to   the patient. The MDRD study equation should only be used in   individuals age 25 or older. It has not been validated for the   following: pregnant women, patients with serious comorbid conditions,   or on certain medications, or persons with extremes of body size,   muscle mass, or nutritional status.       Calcium 11/27/2019 9.1  8.5 - 10.1 MG/DL Final   Hospital Outpatient Visit on 11/20/2019   Component Date Value Ref Range Status    WBC 11/20/2019 11.2  4.6 - 13.2 K/uL Final    RBC 11/20/2019 3.78* 4.20 - 5.30 M/uL Final    HGB 11/20/2019 11.1* 12.0 - 16.0 g/dL Final    HCT 11/20/2019 35.3  35.0 - 45.0 % Final    MCV 11/20/2019 93.4  74.0 - 97.0 FL Final    MCH 11/20/2019 29.4  24.0 - 34.0 PG Final    MCHC 11/20/2019 31.4  31.0 - 37.0 g/dL Final    RDW 11/20/2019 12.9  11.6 - 14.5 % Final    PLATELET 56/11/3427 709* 135 - 420 K/uL Final    MPV 11/20/2019 10.0  9.2 - 11.8 FL Final    NEUTROPHILS 11/20/2019 73  40 - 73 % Final    LYMPHOCYTES 11/20/2019 19* 21 - 52 % Final    MONOCYTES 11/20/2019 7  3 - 10 % Final    EOSINOPHILS 11/20/2019 1  0 - 5 % Final    BASOPHILS 11/20/2019 0  0 - 2 % Final    ABS. NEUTROPHILS 11/20/2019 8.3* 1.8 - 8.0 K/UL Final    ABS. LYMPHOCYTES 11/20/2019 2.1  0.9 - 3.6 K/UL Final    ABS. MONOCYTES 11/20/2019 0.8  0.05 - 1.2 K/UL Final    ABS. EOSINOPHILS 11/20/2019 0.1  0.0 - 0.4 K/UL Final    ABS. BASOPHILS 11/20/2019 0.0  0.0 - 0.1 K/UL Final    DF 11/20/2019 AUTOMATED    Final    Sodium 11/20/2019 135* 136 - 145 mmol/L Final    Potassium 11/20/2019 4.4  3.5 - 5.5 mmol/L Final    Chloride 11/20/2019 102  100 - 111 mmol/L Final    CO2 11/20/2019 27  21 - 32 mmol/L Final    Anion gap 11/20/2019 6  3.0 - 18 mmol/L Final    Glucose 11/20/2019 122* 74 - 99 mg/dL Final    BUN 11/20/2019 9  7.0 - 18 MG/DL Final    Creatinine 11/20/2019 0.73  0.6 - 1.3 MG/DL Final    BUN/Creatinine ratio 11/20/2019 12  12 - 20   Final    GFR est AA 11/20/2019 >60  >60 ml/min/1.73m2 Final    GFR est non-AA 11/20/2019 >60  >60 ml/min/1.73m2 Final    Comment: (NOTE)  Estimated GFR is calculated using the Modification of Diet in Renal   Disease (MDRD) Study equation, reported for both  Americans   (GFRAA) and non- Americans (GFRNA), and normalized to 1.73m2   body surface area. The physician must decide which value applies to   the patient. The MDRD study equation should only be used in   individuals age 25 or older.  It has not been validated for the   following: pregnant women, patients with serious comorbid conditions,   or on certain medications, or persons with extremes of body size,   muscle mass, or nutritional status.  Calcium 11/20/2019 9.5  8.5 - 10.1 MG/DL Final    Bilirubin, total 11/20/2019 0.2  0.2 - 1.0 MG/DL Final    ALT (SGPT) 11/20/2019 36  13 - 56 U/L Final    AST (SGOT) 11/20/2019 26  10 - 38 U/L Final    Alk. phosphatase 11/20/2019 81  45 - 117 U/L Final    Protein, total 11/20/2019 7.7  6.4 - 8.2 g/dL Final    Albumin 11/20/2019 3.4  3.4 - 5.0 g/dL Final    Globulin 11/20/2019 4.3* 2.0 - 4.0 g/dL Final    A-G Ratio 11/20/2019 0.8  0.8 - 1.7   Final   Hospital Outpatient Visit on 11/13/2019   Component Date Value Ref Range Status    Sodium 11/13/2019 138  136 - 145 mmol/L Final    Potassium 11/13/2019 4.3  3.5 - 5.5 mmol/L Final    Chloride 11/13/2019 106  100 - 111 mmol/L Final    CO2 11/13/2019 24  21 - 32 mmol/L Final    Anion gap 11/13/2019 8  3.0 - 18 mmol/L Final    Glucose 11/13/2019 103* 74 - 99 mg/dL Final    BUN 11/13/2019 14  7.0 - 18 MG/DL Final    Creatinine 11/13/2019 0.59* 0.6 - 1.3 MG/DL Final    BUN/Creatinine ratio 11/13/2019 24* 12 - 20   Final    GFR est AA 11/13/2019 >60  >60 ml/min/1.73m2 Final    GFR est non-AA 11/13/2019 >60  >60 ml/min/1.73m2 Final    Comment: (NOTE)  Estimated GFR is calculated using the Modification of Diet in Renal   Disease (MDRD) Study equation, reported for both  Americans   (GFRAA) and non- Americans (GFRNA), and normalized to 1.73m2   body surface area. The physician must decide which value applies to   the patient. The MDRD study equation should only be used in   individuals age 25 or older. It has not been validated for the   following: pregnant women, patients with serious comorbid conditions,   or on certain medications, or persons with extremes of body size,   muscle mass, or nutritional status.       Calcium 11/13/2019 9.5  8.5 - 10.1 MG/DL Final    Bilirubin, total 11/13/2019 0.2  0.2 - 1.0 MG/DL Final    ALT (SGPT) 11/13/2019 21  13 - 56 U/L Final    AST (SGOT) 11/13/2019 7* 10 - 38 U/L Final    Alk. phosphatase 11/13/2019 54  45 - 117 U/L Final    Protein, total 11/13/2019 7.2  6.4 - 8.2 g/dL Final    Albumin 11/13/2019 3.5  3.4 - 5.0 g/dL Final    Globulin 11/13/2019 3.7  2.0 - 4.0 g/dL Final    A-G Ratio 11/13/2019 0.9  0.8 - 1.7   Final    WBC 11/13/2019 15.1* 4.6 - 13.2 K/uL Final    RBC 11/13/2019 3.95* 4.20 - 5.30 M/uL Final    HGB 11/13/2019 11.5* 12.0 - 16.0 g/dL Final    HCT 11/13/2019 36.9  35.0 - 45.0 % Final    MCV 11/13/2019 93.4  74.0 - 97.0 FL Final    MCH 11/13/2019 29.1  24.0 - 34.0 PG Final    MCHC 11/13/2019 31.2  31.0 - 37.0 g/dL Final    RDW 11/13/2019 13.4  11.6 - 14.5 % Final    PLATELET 03/26/2024 007* 135 - 420 K/uL Final    MPV 11/13/2019 10.0  9.2 - 11.8 FL Final    NEUTROPHILS 11/13/2019 78* 40 - 73 % Final    LYMPHOCYTES 11/13/2019 14* 21 - 52 % Final    MONOCYTES 11/13/2019 7  3 - 10 % Final    EOSINOPHILS 11/13/2019 1  0 - 5 % Final    BASOPHILS 11/13/2019 0  0 - 2 % Final    ABS. NEUTROPHILS 11/13/2019 11.9* 1.8 - 8.0 K/UL Final    ABS. LYMPHOCYTES 11/13/2019 2.1  0.9 - 3.6 K/UL Final    ABS. MONOCYTES 11/13/2019 1.0  0.05 - 1.2 K/UL Final    ABS. EOSINOPHILS 11/13/2019 0.1  0.0 - 0.4 K/UL Final    ABS.  BASOPHILS 11/13/2019 0.0  0.0 - 0.1 K/UL Final    DF 11/13/2019 AUTOMATED    Final   Office Visit on 11/01/2019   Component Date Value Ref Range Status    Glucose 11/01/2019 78  65 - 99 mg/dL Final    Comment:               Fasting reference interval         BUN 11/01/2019 10  7 - 25 mg/dL Final    Creatinine 11/01/2019 0.66  0.50 - 1.10 mg/dL Final    GFR est non-AA 11/01/2019 122  > OR = 60 mL/min/1.73m2 Final    GFR est AA 11/01/2019 141  > OR = 60 mL/min/1.73m2 Final    BUN/Creatinine ratio 75/17/0819 NOT APPLICABLE  6 - 22 (calc) Final    Sodium 11/01/2019 139  135 - 146 mmol/L Final    Potassium 11/01/2019 4.5  3.5 - 5.3 mmol/L Final    Chloride 11/01/2019 105  98 - 110 mmol/L Final    CO2 11/01/2019 24  20 - 32 mmol/L Final    Calcium 11/01/2019 9.5  8.6 - 10.2 mg/dL Final    Protein, total 11/01/2019 6.6  6.1 - 8.1 g/dL Final    Albumin 11/01/2019 4.0  3.6 - 5.1 g/dL Final    Globulin 11/01/2019 2.6  1.9 - 3.7 g/dL (calc) Final    ALB/GLOBRATIO 11/01/2019 1.5  1.0 - 2.5 (calc) Final    Bilirubin, total 11/01/2019 0.2  0.2 - 1.2 mg/dL Final    Alk. phosphatase 11/01/2019 32* 33 - 115 U/L Final    AST (SGOT) 11/01/2019 15  10 - 30 U/L Final    ALT (SGPT) 11/01/2019 13  6 - 29 U/L Final    WBC 11/01/2019 6.6  3.8 - 10.8 Thousand/uL Final    RBC 11/01/2019 4.14  3.80 - 5.10 Million/uL Final    HGB 11/01/2019 12.6  11.7 - 15.5 g/dL Final    HCT 11/01/2019 37.3  35.0 - 45.0 % Final    MCV 11/01/2019 90.1  80.0 - 100.0 fL Final    MCH 11/01/2019 30.4  27.0 - 33.0 pg Final    MCHC 11/01/2019 33.8  32.0 - 36.0 g/dL Final    RDW 11/01/2019 12.2  11.0 - 15.0 % Final    PLATELET 95/13/3860 668  140 - 400 Thousand/uL Final    MEAN PLATELET VOLUME 93/37/3510 10.8  7.5 - 12.5 fL Final    ABS. NEUTROPHILS 11/01/2019 4,165  1,500 - 7,800 cells/uL Final    ABSOLUTE BANDS 11/01/2019 CANCELED   Final    Result canceled by the ancillary.  ABSOLUTE METAMYELOCYTES 11/01/2019 CANCELED   Final    Result canceled by the ancillary.  ABSOLUTE MYELOCYTES 11/01/2019 CANCELED   Final    Result canceled by the ancillary.  ABSOLUTE PROMYELOCYTES 11/01/2019 CANCELED   Final    Result canceled by the ancillary.  ABS. LYMPHOCYTES 11/01/2019 1,742  850 - 3,900 cells/uL Final    ABS. MONOCYTES 11/01/2019 554  200 - 950 cells/uL Final    ABS. EOSINOPHILS 11/01/2019 99  15 - 500 cells/uL Final    ABS. BASOPHILS 11/01/2019 40  0 - 200 cells/uL Final    ABSOLUTE BLASTS 11/01/2019 CANCELED   Final    Result canceled by the ancillary.     ABSOLUTE NRBC 11/01/2019 CANCELED   Final Result canceled by the ancillary.  Neutrophils 11/01/2019 63.1  % Final    BAND NEUTROPHILS 11/01/2019 CANCELED   Final    Result canceled by the ancillary.  METAMYELOCYTES 11/01/2019 CANCELED   Final    Result canceled by the ancillary.  MYELOCYTES 11/01/2019 CANCELED   Final    Result canceled by the ancillary.  PROMYELOCYTES 11/01/2019 CANCELED   Final    Result canceled by the ancillary.  LYMPHOCYTES 11/01/2019 26.4  % Final    REACTIVE LYMPHS 11/01/2019 CANCELED   Final    Result canceled by the ancillary.  MONOCYTES 11/01/2019 8.4  % Final    EOSINOPHILS 11/01/2019 1.5  % Final    BASOPHILS 11/01/2019 0.6  % Final    BLASTS 11/01/2019 CANCELED   Final    Result canceled by the ancillary.  NRBC 11/01/2019 CANCELED   Final    Result canceled by the ancillary.  COMMENT(S) 11/01/2019 CANCELED   Final    Result canceled by the ancillary. .  Results for orders placed or performed during the hospital encounter of 11/27/19   CBC WITH AUTOMATED DIFF   Result Value Ref Range    WBC 8.8 4.6 - 13.2 K/uL    RBC 3.97 (L) 4.20 - 5.30 M/uL    HGB 11.5 (L) 12.0 - 16.0 g/dL    HCT 36.5 35.0 - 45.0 %    MCV 91.9 74.0 - 97.0 FL    MCH 29.0 24.0 - 34.0 PG    MCHC 31.5 31.0 - 37.0 g/dL    RDW 13.3 11.6 - 14.5 %    PLATELET 960 (H) 065 - 420 K/uL    MPV 10.4 9.2 - 11.8 FL    NEUTROPHILS 68 40 - 73 %    LYMPHOCYTES 25 21 - 52 %    MONOCYTES 5 3 - 10 %    EOSINOPHILS 1 0 - 5 %    BASOPHILS 1 0 - 2 %    ABS. NEUTROPHILS 6.0 1.8 - 8.0 K/UL    ABS. LYMPHOCYTES 2.2 0.9 - 3.6 K/UL    ABS. MONOCYTES 0.5 0.05 - 1.2 K/UL    ABS. EOSINOPHILS 0.1 0.0 - 0.4 K/UL    ABS.  BASOPHILS 0.1 0.0 - 0.1 K/UL    DF AUTOMATED     METABOLIC PANEL, BASIC   Result Value Ref Range    Sodium 137 136 - 145 mmol/L    Potassium 4.0 3.5 - 5.5 mmol/L    Chloride 107 100 - 111 mmol/L    CO2 25 21 - 32 mmol/L    Anion gap 5 3.0 - 18 mmol/L    Glucose 82 74 - 99 mg/dL    BUN 11 7.0 - 18 MG/DL    Creatinine 0.73 0.6 - 1.3 MG/DL BUN/Creatinine ratio 15 12 - 20      GFR est AA >60 >60 ml/min/1.73m2    GFR est non-AA >60 >60 ml/min/1.73m2    Calcium 9.1 8.5 - 10.1 MG/DL       Assessment / Plan      ICD-10-CM ICD-9-CM    1. History of appendicitis Z87.19 V12.79 CBC WITH AUTOMATED DIFF      METABOLIC PANEL, BASIC   2. Abscess of abdominal cavity (HCC) K65.1 567.22 CBC WITH AUTOMATED DIFF      METABOLIC PANEL, BASIC       Labs ordered  Further plans will be based on the test results  Add Voltaren tab      Follow-up and Dispositions    · Return in about 2 years (around 11/27/2021). I asked Paige Melchor if she has any questions and I answered the questions. Paige Melchor states that she understands the treatment plan and agrees with the treatment plan          THIS DOCUMENT WAS CREATED WITH A VOICE ACTIVATED DICTATION SYSTEM.   IT MAY CONTAIN TRANSCRIPTION ERRORS

## 2019-12-04 RX ORDER — FLUCONAZOLE 100 MG/1
TABLET ORAL
Qty: 7 TAB | Refills: 0 | Status: SHIPPED | OUTPATIENT
Start: 2019-12-04

## 2019-12-06 ENCOUNTER — OFFICE VISIT (OUTPATIENT)
Dept: FAMILY MEDICINE CLINIC | Facility: CLINIC | Age: 24
End: 2019-12-06

## 2019-12-06 VITALS
WEIGHT: 174 LBS | DIASTOLIC BLOOD PRESSURE: 59 MMHG | RESPIRATION RATE: 12 BRPM | TEMPERATURE: 98 F | HEIGHT: 63 IN | HEART RATE: 84 BPM | SYSTOLIC BLOOD PRESSURE: 118 MMHG | OXYGEN SATURATION: 98 % | BODY MASS INDEX: 30.83 KG/M2

## 2019-12-06 DIAGNOSIS — R09.1 PLEURITIS: ICD-10-CM

## 2019-12-06 DIAGNOSIS — Z87.19 HISTORY OF APPENDICITIS: ICD-10-CM

## 2019-12-06 DIAGNOSIS — K65.1 ABSCESS OF ABDOMINAL CAVITY (HCC): Primary | ICD-10-CM

## 2019-12-06 NOTE — LETTER
19 RE:  Bibi Navas : 1995 To whom it may concern: 
 
 I am Bassam العلي's primary care physician. 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 She may continue to work from home. After this she may work without restrictions Sincerely, Alex Hwang M.D.   FACP

## 2019-12-06 NOTE — PROGRESS NOTES
Visit Vitals  /59   Pulse 84   Temp 98 °F (36.7 °C) (Oral)   Resp 12   Ht 5' 3\" (1.6 m)   Wt 174 lb (78.9 kg)   SpO2 98%   BMI 30.82 kg/m²     Naseem Odell presents today for   Chief Complaint   Patient presents with    Follow-up     post-op       Is someone accompanying this pt? no    Is the patient using any DME equipment during OV? no    Depression Screening:  3 most recent PHQ Screens 9/11/2015   Little interest or pleasure in doing things Several days   Feeling down, depressed, irritable, or hopeless Several days   Total Score PHQ 2 2       Learning Assessment:  Learning Assessment 4/25/2016   PRIMARY LEARNER Patient   HIGHEST LEVEL OF EDUCATION - PRIMARY LEARNER  4 YEARS OF COLLEGE   BARRIERS PRIMARY LEARNER NONE   CO-LEARNER CAREGIVER No   PRIMARY LANGUAGE ENGLISH    NEED No   LEARNER PREFERENCE PRIMARY DEMONSTRATION   ANSWERED BY PATIENT   RELATIONSHIP SELF       Abuse Screening:  Abuse Screening Questionnaire 4/25/2016   Do you ever feel afraid of your partner? N   Are you in a relationship with someone who physically or mentally threatens you? N   Is it safe for you to go home? Y       Fall Risk  No flowsheet data found. Health Maintenance reviewed and discussed and ordered per Provider. Health Maintenance Due   Topic Date Due    HPV Age 9Y-34Y (2 - Female 3-dose series) 08/23/2011    PAP AKA CERVICAL CYTOLOGY  08/04/2014    MEDICARE YEARLY EXAM  02/11/2019           Coordination of Care:  1. Have you been to the ER, urgent care clinic since your last visit? Hospitalized since your last visit? no    2. Have you seen or consulted any other health care providers outside of the [address] [place] [address] since your last visit? Include any pap smears or colon screening.  no

## 2019-12-09 NOTE — PROGRESS NOTES
The patient presents to the office today with the chief complaint of pleuritic pain    HPI    The patient persists with pleuritic patient said bowel movements have now returned patient notes no chills or fever. Other than the pleuritic pain the patient feels fairly good. It should be noted that the previous CTA scan was negative for clot or pleural effusion. The last white count was in the mid 8000 range. ROS  Negative for fever chills, or abdominal pain    No Known Allergies    Current Outpatient Medications   Medication Sig Dispense Refill    fluconazole (DIFLUCAN) 100 mg tablet 1 tab daily 7 Tab 0    norgestimate-ethinyl estradiol (SPRINTEC, 28,) 0.25-35 mg-mcg tab Take 1 Tab by mouth daily.          Past Medical History:   Diagnosis Date    Acute laryngitis, without mention of obstruction     Acute pharyngitis     Acute upper respiratory infections of unspecified site     Dizziness and giddiness     Other and unspecified noninfectious gastroenteritis and colitis(558.9)     Paresthesia        Past Surgical History:   Procedure Laterality Date    HX APPENDECTOMY  11/2019       Social History     Socioeconomic History    Marital status: UNKNOWN     Spouse name: Not on file    Number of children: Not on file    Years of education: Not on file    Highest education level: Not on file   Occupational History    Not on file   Social Needs    Financial resource strain: Not on file    Food insecurity:     Worry: Not on file     Inability: Not on file    Transportation needs:     Medical: Not on file     Non-medical: Not on file   Tobacco Use    Smoking status: Never Smoker    Smokeless tobacco: Never Used   Substance and Sexual Activity    Alcohol use: No    Drug use: No    Sexual activity: Yes     Partners: Male   Lifestyle    Physical activity:     Days per week: Not on file     Minutes per session: Not on file    Stress: Not on file   Relationships    Social connections:     Talks on phone: Not on file     Gets together: Not on file     Attends Congregation service: Not on file     Active member of club or organization: Not on file     Attends meetings of clubs or organizations: Not on file     Relationship status: Not on file    Intimate partner violence:     Fear of current or ex partner: Not on file     Emotionally abused: Not on file     Physically abused: Not on file     Forced sexual activity: Not on file   Other Topics Concern    Not on file   Social History Narrative    Not on file       Patient does not have an advanced directive on file    Visit Vitals  /59   Pulse 84   Temp 98 °F (36.7 °C) (Oral)   Resp 12   Ht 5' 3\" (1.6 m)   Wt 174 lb (78.9 kg)   SpO2 98%   BMI 30.82 kg/m²       Physical Exam  No Cervical Lymphadenopathy  No Supraclavicular Lymphadenopathy  Thyroid is Normal  Lungs are normal to percussion. Clear to auscultation   Heart:  S1 S2 are normal, No gallops, No murmurs  No Carotid Bruits  Abdomen:  Normal Bowel Sounds. Moderate tenderness in the right lower quadrant there is slight voluntary guarding. There is no involuntary guarding or rebound. . No masses. No Hepatomegaly or Splenomegaly  LE:  Strong Pedal Pulses.   No Edema      Hospital Outpatient Visit on 11/27/2019   Component Date Value Ref Range Status    WBC 11/27/2019 8.8  4.6 - 13.2 K/uL Final    RBC 11/27/2019 3.97* 4.20 - 5.30 M/uL Final    HGB 11/27/2019 11.5* 12.0 - 16.0 g/dL Final    HCT 11/27/2019 36.5  35.0 - 45.0 % Final    MCV 11/27/2019 91.9  74.0 - 97.0 FL Final    MCH 11/27/2019 29.0  24.0 - 34.0 PG Final    MCHC 11/27/2019 31.5  31.0 - 37.0 g/dL Final    RDW 11/27/2019 13.3  11.6 - 14.5 % Final    PLATELET 68/27/6212 630* 135 - 420 K/uL Final    MPV 11/27/2019 10.4  9.2 - 11.8 FL Final    NEUTROPHILS 11/27/2019 68  40 - 73 % Final    LYMPHOCYTES 11/27/2019 25  21 - 52 % Final    MONOCYTES 11/27/2019 5  3 - 10 % Final    EOSINOPHILS 11/27/2019 1  0 - 5 % Final    BASOPHILS 11/27/2019 1  0 - 2 % Final    ABS. NEUTROPHILS 11/27/2019 6.0  1.8 - 8.0 K/UL Final    ABS. LYMPHOCYTES 11/27/2019 2.2  0.9 - 3.6 K/UL Final    ABS. MONOCYTES 11/27/2019 0.5  0.05 - 1.2 K/UL Final    ABS. EOSINOPHILS 11/27/2019 0.1  0.0 - 0.4 K/UL Final    ABS. BASOPHILS 11/27/2019 0.1  0.0 - 0.1 K/UL Final    DF 11/27/2019 AUTOMATED    Final    Sodium 11/27/2019 137  136 - 145 mmol/L Final    Potassium 11/27/2019 4.0  3.5 - 5.5 mmol/L Final    Chloride 11/27/2019 107  100 - 111 mmol/L Final    CO2 11/27/2019 25  21 - 32 mmol/L Final    Anion gap 11/27/2019 5  3.0 - 18 mmol/L Final    Glucose 11/27/2019 82  74 - 99 mg/dL Final    BUN 11/27/2019 11  7.0 - 18 MG/DL Final    Creatinine 11/27/2019 0.73  0.6 - 1.3 MG/DL Final    BUN/Creatinine ratio 11/27/2019 15  12 - 20   Final    GFR est AA 11/27/2019 >60  >60 ml/min/1.73m2 Final    GFR est non-AA 11/27/2019 >60  >60 ml/min/1.73m2 Final    Comment: (NOTE)  Estimated GFR is calculated using the Modification of Diet in Renal   Disease (MDRD) Study equation, reported for both  Americans   (GFRAA) and non- Americans (GFRNA), and normalized to 1.73m2   body surface area. The physician must decide which value applies to   the patient. The MDRD study equation should only be used in   individuals age 25 or older. It has not been validated for the   following: pregnant women, patients with serious comorbid conditions,   or on certain medications, or persons with extremes of body size,   muscle mass, or nutritional status.       Calcium 11/27/2019 9.1  8.5 - 10.1 MG/DL Final   Hospital Outpatient Visit on 11/20/2019   Component Date Value Ref Range Status    WBC 11/20/2019 11.2  4.6 - 13.2 K/uL Final    RBC 11/20/2019 3.78* 4.20 - 5.30 M/uL Final    HGB 11/20/2019 11.1* 12.0 - 16.0 g/dL Final    HCT 11/20/2019 35.3  35.0 - 45.0 % Final    MCV 11/20/2019 93.4  74.0 - 97.0 FL Final    MCH 11/20/2019 29.4  24.0 - 34.0 PG Final    MCHC 11/20/2019 31.4  31.0 - 37.0 g/dL Final    RDW 11/20/2019 12.9  11.6 - 14.5 % Final    PLATELET 28/29/9498 648* 135 - 420 K/uL Final    MPV 11/20/2019 10.0  9.2 - 11.8 FL Final    NEUTROPHILS 11/20/2019 73  40 - 73 % Final    LYMPHOCYTES 11/20/2019 19* 21 - 52 % Final    MONOCYTES 11/20/2019 7  3 - 10 % Final    EOSINOPHILS 11/20/2019 1  0 - 5 % Final    BASOPHILS 11/20/2019 0  0 - 2 % Final    ABS. NEUTROPHILS 11/20/2019 8.3* 1.8 - 8.0 K/UL Final    ABS. LYMPHOCYTES 11/20/2019 2.1  0.9 - 3.6 K/UL Final    ABS. MONOCYTES 11/20/2019 0.8  0.05 - 1.2 K/UL Final    ABS. EOSINOPHILS 11/20/2019 0.1  0.0 - 0.4 K/UL Final    ABS. BASOPHILS 11/20/2019 0.0  0.0 - 0.1 K/UL Final    DF 11/20/2019 AUTOMATED    Final    Sodium 11/20/2019 135* 136 - 145 mmol/L Final    Potassium 11/20/2019 4.4  3.5 - 5.5 mmol/L Final    Chloride 11/20/2019 102  100 - 111 mmol/L Final    CO2 11/20/2019 27  21 - 32 mmol/L Final    Anion gap 11/20/2019 6  3.0 - 18 mmol/L Final    Glucose 11/20/2019 122* 74 - 99 mg/dL Final    BUN 11/20/2019 9  7.0 - 18 MG/DL Final    Creatinine 11/20/2019 0.73  0.6 - 1.3 MG/DL Final    BUN/Creatinine ratio 11/20/2019 12  12 - 20   Final    GFR est AA 11/20/2019 >60  >60 ml/min/1.73m2 Final    GFR est non-AA 11/20/2019 >60  >60 ml/min/1.73m2 Final    Comment: (NOTE)  Estimated GFR is calculated using the Modification of Diet in Renal   Disease (MDRD) Study equation, reported for both  Americans   (GFRAA) and non- Americans (GFRNA), and normalized to 1.73m2   body surface area. The physician must decide which value applies to   the patient. The MDRD study equation should only be used in   individuals age 25 or older. It has not been validated for the   following: pregnant women, patients with serious comorbid conditions,   or on certain medications, or persons with extremes of body size,   muscle mass, or nutritional status.       Calcium 11/20/2019 9.5  8.5 - 10.1 MG/DL Final    Bilirubin, total 11/20/2019 0.2  0.2 - 1.0 MG/DL Final    ALT (SGPT) 11/20/2019 36  13 - 56 U/L Final    AST (SGOT) 11/20/2019 26  10 - 38 U/L Final    Alk. phosphatase 11/20/2019 81  45 - 117 U/L Final    Protein, total 11/20/2019 7.7  6.4 - 8.2 g/dL Final    Albumin 11/20/2019 3.4  3.4 - 5.0 g/dL Final    Globulin 11/20/2019 4.3* 2.0 - 4.0 g/dL Final    A-G Ratio 11/20/2019 0.8  0.8 - 1.7   Final   Hospital Outpatient Visit on 11/13/2019   Component Date Value Ref Range Status    Sodium 11/13/2019 138  136 - 145 mmol/L Final    Potassium 11/13/2019 4.3  3.5 - 5.5 mmol/L Final    Chloride 11/13/2019 106  100 - 111 mmol/L Final    CO2 11/13/2019 24  21 - 32 mmol/L Final    Anion gap 11/13/2019 8  3.0 - 18 mmol/L Final    Glucose 11/13/2019 103* 74 - 99 mg/dL Final    BUN 11/13/2019 14  7.0 - 18 MG/DL Final    Creatinine 11/13/2019 0.59* 0.6 - 1.3 MG/DL Final    BUN/Creatinine ratio 11/13/2019 24* 12 - 20   Final    GFR est AA 11/13/2019 >60  >60 ml/min/1.73m2 Final    GFR est non-AA 11/13/2019 >60  >60 ml/min/1.73m2 Final    Comment: (NOTE)  Estimated GFR is calculated using the Modification of Diet in Renal   Disease (MDRD) Study equation, reported for both  Americans   (GFRAA) and non- Americans (GFRNA), and normalized to 1.73m2   body surface area. The physician must decide which value applies to   the patient. The MDRD study equation should only be used in   individuals age 25 or older. It has not been validated for the   following: pregnant women, patients with serious comorbid conditions,   or on certain medications, or persons with extremes of body size,   muscle mass, or nutritional status.  Calcium 11/13/2019 9.5  8.5 - 10.1 MG/DL Final    Bilirubin, total 11/13/2019 0.2  0.2 - 1.0 MG/DL Final    ALT (SGPT) 11/13/2019 21  13 - 56 U/L Final    AST (SGOT) 11/13/2019 7* 10 - 38 U/L Final    Alk.  phosphatase 11/13/2019 54  45 - 117 U/L Final    Protein, total 11/13/2019 7.2  6.4 - 8.2 g/dL Final    Albumin 11/13/2019 3.5  3.4 - 5.0 g/dL Final    Globulin 11/13/2019 3.7  2.0 - 4.0 g/dL Final    A-G Ratio 11/13/2019 0.9  0.8 - 1.7   Final    WBC 11/13/2019 15.1* 4.6 - 13.2 K/uL Final    RBC 11/13/2019 3.95* 4.20 - 5.30 M/uL Final    HGB 11/13/2019 11.5* 12.0 - 16.0 g/dL Final    HCT 11/13/2019 36.9  35.0 - 45.0 % Final    MCV 11/13/2019 93.4  74.0 - 97.0 FL Final    MCH 11/13/2019 29.1  24.0 - 34.0 PG Final    MCHC 11/13/2019 31.2  31.0 - 37.0 g/dL Final    RDW 11/13/2019 13.4  11.6 - 14.5 % Final    PLATELET 36/16/4107 342* 135 - 420 K/uL Final    MPV 11/13/2019 10.0  9.2 - 11.8 FL Final    NEUTROPHILS 11/13/2019 78* 40 - 73 % Final    LYMPHOCYTES 11/13/2019 14* 21 - 52 % Final    MONOCYTES 11/13/2019 7  3 - 10 % Final    EOSINOPHILS 11/13/2019 1  0 - 5 % Final    BASOPHILS 11/13/2019 0  0 - 2 % Final    ABS. NEUTROPHILS 11/13/2019 11.9* 1.8 - 8.0 K/UL Final    ABS. LYMPHOCYTES 11/13/2019 2.1  0.9 - 3.6 K/UL Final    ABS. MONOCYTES 11/13/2019 1.0  0.05 - 1.2 K/UL Final    ABS. EOSINOPHILS 11/13/2019 0.1  0.0 - 0.4 K/UL Final    ABS.  BASOPHILS 11/13/2019 0.0  0.0 - 0.1 K/UL Final    DF 11/13/2019 AUTOMATED    Final   Office Visit on 11/01/2019   Component Date Value Ref Range Status    Glucose 11/01/2019 78  65 - 99 mg/dL Final    Comment:               Fasting reference interval         BUN 11/01/2019 10  7 - 25 mg/dL Final    Creatinine 11/01/2019 0.66  0.50 - 1.10 mg/dL Final    GFR est non-AA 11/01/2019 122  > OR = 60 mL/min/1.73m2 Final    GFR est AA 11/01/2019 141  > OR = 60 mL/min/1.73m2 Final    BUN/Creatinine ratio 06/20/8923 NOT APPLICABLE  6 - 22 (calc) Final    Sodium 11/01/2019 139  135 - 146 mmol/L Final    Potassium 11/01/2019 4.5  3.5 - 5.3 mmol/L Final    Chloride 11/01/2019 105  98 - 110 mmol/L Final    CO2 11/01/2019 24  20 - 32 mmol/L Final    Calcium 11/01/2019 9.5  8.6 - 10.2 mg/dL Final    Protein, total 11/01/2019 6.6  6.1 - 8.1 g/dL Final    Albumin 11/01/2019 4.0  3.6 - 5.1 g/dL Final    Globulin 11/01/2019 2.6  1.9 - 3.7 g/dL (calc) Final    ALB/GLOBRATIO 11/01/2019 1.5  1.0 - 2.5 (calc) Final    Bilirubin, total 11/01/2019 0.2  0.2 - 1.2 mg/dL Final    Alk. phosphatase 11/01/2019 32* 33 - 115 U/L Final    AST (SGOT) 11/01/2019 15  10 - 30 U/L Final    ALT (SGPT) 11/01/2019 13  6 - 29 U/L Final    WBC 11/01/2019 6.6  3.8 - 10.8 Thousand/uL Final    RBC 11/01/2019 4.14  3.80 - 5.10 Million/uL Final    HGB 11/01/2019 12.6  11.7 - 15.5 g/dL Final    HCT 11/01/2019 37.3  35.0 - 45.0 % Final    MCV 11/01/2019 90.1  80.0 - 100.0 fL Final    MCH 11/01/2019 30.4  27.0 - 33.0 pg Final    MCHC 11/01/2019 33.8  32.0 - 36.0 g/dL Final    RDW 11/01/2019 12.2  11.0 - 15.0 % Final    PLATELET 67/45/7224 948  140 - 400 Thousand/uL Final    MEAN PLATELET VOLUME 98/71/1389 10.8  7.5 - 12.5 fL Final    ABS. NEUTROPHILS 11/01/2019 4,165  1,500 - 7,800 cells/uL Final    ABSOLUTE BANDS 11/01/2019 CANCELED   Final    Result canceled by the ancillary.  ABSOLUTE METAMYELOCYTES 11/01/2019 CANCELED   Final    Result canceled by the ancillary.  ABSOLUTE MYELOCYTES 11/01/2019 CANCELED   Final    Result canceled by the ancillary.  ABSOLUTE PROMYELOCYTES 11/01/2019 CANCELED   Final    Result canceled by the ancillary.  ABS. LYMPHOCYTES 11/01/2019 1,742  850 - 3,900 cells/uL Final    ABS. MONOCYTES 11/01/2019 554  200 - 950 cells/uL Final    ABS. EOSINOPHILS 11/01/2019 99  15 - 500 cells/uL Final    ABS. BASOPHILS 11/01/2019 40  0 - 200 cells/uL Final    ABSOLUTE BLASTS 11/01/2019 CANCELED   Final    Result canceled by the ancillary.  ABSOLUTE NRBC 11/01/2019 CANCELED   Final    Result canceled by the ancillary.  Neutrophils 11/01/2019 63.1  % Final    BAND NEUTROPHILS 11/01/2019 CANCELED   Final    Result canceled by the ancillary.  METAMYELOCYTES 11/01/2019 CANCELED   Final    Result canceled by the ancillary.     MYELOCYTES 11/01/2019 CANCELED   Final    Result canceled by the ancillary.  PROMYELOCYTES 11/01/2019 CANCELED   Final    Result canceled by the ancillary.  LYMPHOCYTES 11/01/2019 26.4  % Final    REACTIVE LYMPHS 11/01/2019 CANCELED   Final    Result canceled by the ancillary.  MONOCYTES 11/01/2019 8.4  % Final    EOSINOPHILS 11/01/2019 1.5  % Final    BASOPHILS 11/01/2019 0.6  % Final    BLASTS 11/01/2019 CANCELED   Final    Result canceled by the ancillary.  NRBC 11/01/2019 CANCELED   Final    Result canceled by the ancillary.  COMMENT(S) 11/01/2019 CANCELED   Final    Result canceled by the ancillary. .No results found for any visits on 12/06/19. Assessment / Plan      ICD-10-CM ICD-9-CM    1. Abscess of abdominal cavity (HCC) K65.1 567.22 CBC WITH AUTOMATED DIFF   2. History of appendicitis Z87.19 V12.79 CBC WITH AUTOMATED DIFF   3. Pleuritis R09.1 511.0 CBC WITH AUTOMATED DIFF     The etiology of the pleuritic pain is unclear. I have some concern that the patient's tenderness in the right lower quadrant of the abdomen is a bit more pronounced than the last visit the patient however has no fever or any other findings that would suggest worsening of the small abscess. CBC ordered  she was advised to continue her maintenance medications  The patient will be evaluated by general surgery again on Monday      Follow-up and Dispositions    · Return in about 6 weeks (around 1/17/2020). I asked Livier Ricardo if she has any questions and I answered the questions. Livier Ricardo states that she understands the treatment plan and agrees with the treatment plan          THIS DOCUMENT WAS CREATED WITH A VOICE ACTIVATED DICTATION SYSTEM.   IT MAY CONTAIN TRANSCRIPTION ERRORS

## 2020-01-10 DIAGNOSIS — Z87.19 HISTORY OF APPENDICITIS: ICD-10-CM
